# Patient Record
Sex: FEMALE | Race: WHITE | Employment: OTHER | URBAN - METROPOLITAN AREA
[De-identification: names, ages, dates, MRNs, and addresses within clinical notes are randomized per-mention and may not be internally consistent; named-entity substitution may affect disease eponyms.]

---

## 2017-10-25 ENCOUNTER — HOSPITAL ENCOUNTER (OUTPATIENT)
Dept: LAB | Age: 72
Discharge: HOME | End: 2017-10-25
Attending: SPECIALIST | Admitting: SPECIALIST

## 2017-10-28 ENCOUNTER — HOSPITAL ENCOUNTER (OUTPATIENT)
Dept: OTHER | Age: 72
Discharge: HOME | End: 2017-10-28
Attending: INTERNAL MEDICINE | Admitting: INTERNAL MEDICINE

## 2017-10-29 LAB — CLOSTRIDIUM DIFFICILE, PCR: NORMAL

## 2017-10-30 ENCOUNTER — PAT TELEPHONE (OUTPATIENT)
Dept: SURGERY | Age: 72
End: 2017-10-30

## 2017-10-30 VITALS — HEIGHT: 65 IN | BODY MASS INDEX: 33.99 KG/M2 | WEIGHT: 204 LBS

## 2017-10-30 NOTE — PROGRESS NOTES
1. Hx of anesthesia complications? -- no  2. Hx of difficult airway/intubation? -- no  3. Hx of changed chest pain/CHF exacerbation in last 6 mo. ? -- no  4. Home  O2 dependent? -- no  5. Able to climb one flight of stairs w/o SOB? -- yes  6. Recent COPD exacerbation or pneumonia/bronchitis that has been treated in last      month? -- no       1. Do not eat or drink anything after 12 midnight (or____hours) unless instructed by your doctor prior to surgery. This includes no water, chewing gum or mints. 2. Follow your directions as prescribed by the doctor for your procedure and medications. 3.Check with your Doctor regarding stopping Plavix, Coumadin, Lovenox,Effient,Pradaxa,Xarelto, Fragmin or other blood thinners and follow their instructions. 4. Do not smoke, and do not drink any alcoholic beverages 24 hours prior to surgery. This includes NA Beer. 5. You may brush your teeth and gargle the morning of surgery. DO NOT SWALLOW WATER   6. You MUST make arrangements for a responsible adult to take you home after your surgery and be able to check on you every couple hours for the day. You will not be allowed to leave alone or drive yourself home. It is strongly suggested someone stay with you the first 24 hrs. Your surgery will be cancelled if you do not have a ride home. 7. Please wear simple, loose fitting clothing to the hospital.  Skeet Lute not bring valuables (money, credit cards, checkbooks, etc.) Do not wear any makeup (including no eye makeup) or nail polish on your fingers or toes. 8. DO NOT wear any jewelry or piercings on day of surgery. All body piercing jewelry must be removed. 9. If you have dentures, they will be removed before going to the OR; we will provide you a container. If you wear contact lenses or glasses, they will be removed; please bring a case for them.              10. If you  have a Living Will and Durable Power of  for Healthcare, please bring in a

## 2017-10-31 NOTE — ANESTHESIA PRE-OP
Department of Anesthesiology  Preprocedure Note       Name:  Mily Pete   Age:  74 y.o.  :  1945                                          MRN:  [id number]         Date:  10/31/2017      Surgeon:    Procedure: colonoscopy    Medications prior to admission:   Prior to Admission medications    Medication Sig Start Date End Date Taking? Authorizing Provider   oxyCODONE-acetaminophen (PERCOCET) 5-325 MG per tablet  4/15/16   Historical Provider, MD   ibuprofen (ADVIL;MOTRIN) 600 MG tablet Take 1 tablet by mouth every 6 hours as needed for Pain 8/22/15   Akash Rosas MD   clopidogrel (PLAVIX) 75 MG tablet TAKE 1 TABLET DAILY 14   Kristin Teixeira MD   carvedilol (COREG) 25 MG tablet Take 1 tablet by mouth daily. 14   Historical Provider, MD   losartan-hydrochlorothiazide (HYZAAR) 100-25 MG per tablet Take 1 tablet by mouth daily. Historical Provider, MD   fluticasone-salmeterol (ADVAIR) 250-50 MCG/DOSE AEPB Inhale 1 puff into the lungs every 12 hours. Historical Provider, MD   albuterol (PROVENTIL HFA;VENTOLIN HFA) 108 (90 BASE) MCG/ACT inhaler Inhale 2 puffs into the lungs every 6 hours as needed for Wheezing. 5/15/13 9/2/14  Kristin Teixeira MD   pantoprazole (PROTONIX) 40 MG tablet Take 40 mg by mouth daily  13   Historical Provider, MD   metformin (GLUCOPHAGE) 500 MG tablet TAKE 1 TABLET BY MOUTH TWICE A DAY WITH MEALS 13   Kristin Teixeira MD   insulin glargine (LANTUS SOLOSTAR) 100 UNIT/ML injection INJECT 48 UNITS UNDER THE SKIN NIGHTLY 5/15/13   Kristin Teixeira MD   Insulin Pen Needle (UNIFINE PENTIPS) 31G X 8 MM MISC Injects once daily 5/15/13   Kristin Teixeira MD   torsemide (DEMADEX) 5 MG tablet Take 1 tablet by mouth daily. For swelling. 3/21/13   Homero Jara MD   atorvastatin (LIPITOR) 20 MG tablet Take 1 tablet by mouth daily. 3/21/13   Homero Jara MD   cloNIDine (CATAPRES) 0.1 MG tablet Take 1 tablet by mouth 2 times daily.  3/21/13   [place] Bria Kelly MD   amLODIPine (NORVASC) 5 MG tablet Take 1 tab at bedtime for blood pressure 3/21/13   Sadia Gilliland MD   sertraline (ZOLOFT) 50 MG tablet Take 1 tablet by mouth daily. 3/21/13   Sadia Gilliland MD   albuterol (PROVENTIL HFA) 108 (90 BASE) MCG/ACT inhaler Inhale 2 puffs into the lungs every 6 hours as needed for Wheezing. 5/14/12 5/14/13  Jake Jacobson MD       Current medications:    Current Outpatient Prescriptions   Medication Sig Dispense Refill    oxyCODONE-acetaminophen (PERCOCET) 5-325 MG per tablet       ibuprofen (ADVIL;MOTRIN) 600 MG tablet Take 1 tablet by mouth every 6 hours as needed for Pain 30 tablet 0    clopidogrel (PLAVIX) 75 MG tablet TAKE 1 TABLET DAILY 90 tablet 3    carvedilol (COREG) 25 MG tablet Take 1 tablet by mouth daily.  losartan-hydrochlorothiazide (HYZAAR) 100-25 MG per tablet Take 1 tablet by mouth daily.  fluticasone-salmeterol (ADVAIR) 250-50 MCG/DOSE AEPB Inhale 1 puff into the lungs every 12 hours.  albuterol (PROVENTIL HFA;VENTOLIN HFA) 108 (90 BASE) MCG/ACT inhaler Inhale 2 puffs into the lungs every 6 hours as needed for Wheezing.  pantoprazole (PROTONIX) 40 MG tablet Take 40 mg by mouth daily       metformin (GLUCOPHAGE) 500 MG tablet TAKE 1 TABLET BY MOUTH TWICE A DAY WITH MEALS 60 tablet 5    insulin glargine (LANTUS SOLOSTAR) 100 UNIT/ML injection INJECT 48 UNITS UNDER THE SKIN NIGHTLY 15 Pen 3    Insulin Pen Needle (UNIFINE PENTIPS) 31G X 8 MM MISC Injects once daily 100 each 3    torsemide (DEMADEX) 5 MG tablet Take 1 tablet by mouth daily. For swelling. 90 tablet 3    atorvastatin (LIPITOR) 20 MG tablet Take 1 tablet by mouth daily. 90 tablet 3    cloNIDine (CATAPRES) 0.1 MG tablet Take 1 tablet by mouth 2 times daily. 180 tablet 3    amLODIPine (NORVASC) 5 MG tablet Take 1 tab at bedtime for blood pressure 90 tablet 3    sertraline (ZOLOFT) 50 MG tablet Take 1 tablet by mouth daily.  30 tablet 1    albuterol 1 Cans of beer per week      Comment: rarely                                Counseling given: Not Answered      Vital Signs (Current): There were no vitals filed for this visit. BP Readings from Last 3 Encounters:   06/01/16 142/80   04/18/16 130/60   04/06/16 124/72       NPO Status:                                                                                 BMI:   Wt Readings from Last 3 Encounters:   10/30/17 204 lb (92.5 kg)   09/15/16 215 lb (97.5 kg)   09/07/16 215 lb (97.5 kg)     There is no height or weight on file to calculate BMI. Anesthesia Evaluation    Airway:         Dental:          Pulmonary:   (+) sleep apnea: on CPAP,                             Cardiovascular:    (+) hypertension:, hyperlipidemia         Beta Blocker:  Not on Beta Blocker         Neuro/Psych:   (+) CVA:,             GI/Hepatic/Renal:   (+) hiatal hernia, GERD:, bowel prep          Endo/Other:    (+) Type II DM, using insulin,                  Abdominal:           Vascular:                                      Anesthesia Plan      MAC     ASA 3       Induction: intravenous. Pre Anesthesia Assessment complete. Chart reviewed on 10/31/2017. This is a chart review only.       Bryant Link DO   10/31/2017

## 2017-11-01 LAB
CULTURE: NORMAL
OVA AND PARASITE WET MOUNT: NEGATIVE
REPORT STATUS: NORMAL
REQUEST PROBLEM: NORMAL
SPECIMEN: NORMAL
TRICHROME SMEAR, STOOL O&P: NEGATIVE

## 2017-11-02 ENCOUNTER — HOSPITAL ENCOUNTER (OUTPATIENT)
Dept: SURGERY | Age: 72
Discharge: HOME | End: 2017-11-02
Attending: SPECIALIST | Admitting: SPECIALIST

## 2017-11-02 VITALS
OXYGEN SATURATION: 97 % | BODY MASS INDEX: 34.32 KG/M2 | SYSTOLIC BLOOD PRESSURE: 158 MMHG | HEART RATE: 56 BPM | TEMPERATURE: 97.7 F | HEIGHT: 65 IN | DIASTOLIC BLOOD PRESSURE: 64 MMHG | WEIGHT: 206 LBS | RESPIRATION RATE: 16 BRPM

## 2017-11-02 LAB — GLUCOSE BLD-MCNC: 111 MG/DL (ref 70–99)

## 2017-11-02 RX ORDER — SODIUM CHLORIDE, SODIUM LACTATE, POTASSIUM CHLORIDE, CALCIUM CHLORIDE 600; 310; 30; 20 MG/100ML; MG/100ML; MG/100ML; MG/100ML
INJECTION, SOLUTION INTRAVENOUS CONTINUOUS
Status: DISCONTINUED | OUTPATIENT
Start: 2017-11-02 | End: 2017-11-03 | Stop reason: HOSPADM

## 2017-11-02 RX ADMIN — SODIUM CHLORIDE, SODIUM LACTATE, POTASSIUM CHLORIDE, CALCIUM CHLORIDE: 600; 310; 30; 20 INJECTION, SOLUTION INTRAVENOUS at 10:29

## 2017-11-02 ASSESSMENT — PAIN SCALES - GENERAL
PAINLEVEL_OUTOF10: 0
PAINLEVEL_OUTOF10: 0

## 2017-11-02 ASSESSMENT — PAIN - FUNCTIONAL ASSESSMENT: PAIN_FUNCTIONAL_ASSESSMENT: 0-10

## 2017-11-02 NOTE — PROGRESS NOTES
1222 denies pain or nausea. Head of bed up. Dr Anna Juarez provided update at bedside, call light in reach  122 coffee and melecio crackers provided  [phone] denies needs  [phone] discharge instructions reviewed.  Verbalized understanding  [phone] ambulated to bathroom  [phone] discharged to car

## 2017-11-02 NOTE — ANESTHESIA PRE-OP
Department of Anesthesiology  Preprocedure Note       Name:  Trever Quinteros   Age:  74 y.o.  :  1945                                          MRN:  [id number]         Date:  2017      Surgeon:    Procedure: colonoscopy    Medications prior to admission:   Prior to Admission medications    Medication Sig Start Date End Date Taking? Authorizing Provider   oxyCODONE-acetaminophen (PERCOCET) 5-325 MG per tablet  4/15/16   Historical Provider, MD   ibuprofen (ADVIL;MOTRIN) 600 MG tablet Take 1 tablet by mouth every 6 hours as needed for Pain 8/22/15   Niles Fuller MD   clopidogrel (PLAVIX) 75 MG tablet TAKE 1 TABLET DAILY 14   Di Meadows MD   carvedilol (COREG) 25 MG tablet Take 1 tablet by mouth daily. 14   Historical Provider, MD   losartan-hydrochlorothiazide (HYZAAR) 100-25 MG per tablet Take 1 tablet by mouth daily. Historical Provider, MD   fluticasone-salmeterol (ADVAIR) 250-50 MCG/DOSE AEPB Inhale 1 puff into the lungs every 12 hours. Historical Provider, MD   albuterol (PROVENTIL HFA;VENTOLIN HFA) 108 (90 BASE) MCG/ACT inhaler Inhale 2 puffs into the lungs every 6 hours as needed for Wheezing. 5/15/13 9/2/14  Di Meadows MD   pantoprazole (PROTONIX) 40 MG tablet Take 40 mg by mouth daily  13   Historical Provider, MD   metformin (GLUCOPHAGE) 500 MG tablet TAKE 1 TABLET BY MOUTH TWICE A DAY WITH MEALS 13   Di Meadows MD   insulin glargine (LANTUS SOLOSTAR) 100 UNIT/ML injection INJECT 48 UNITS UNDER THE SKIN NIGHTLY 5/15/13   Di Meadows MD   Insulin Pen Needle (UNIFINE PENTIPS) 31G X 8 MM MISC Injects once daily 5/15/13   Di Meadows MD   torsemide (DEMADEX) 5 MG tablet Take 1 tablet by mouth daily. For swelling. 3/21/13   Flash Carvajal MD   atorvastatin (LIPITOR) 20 MG tablet Take 1 tablet by mouth daily. 3/21/13   Flash Carvajal MD   cloNIDine (CATAPRES) 0.1 MG tablet Take 1 tablet by mouth 2 times daily.  3/21/13   Christiano Issa Dallin Mchugh MD   amLODIPine (NORVASC) 5 MG tablet Take 1 tab at bedtime for blood pressure 3/21/13   Hans Escobar MD   sertraline (ZOLOFT) 50 MG tablet Take 1 tablet by mouth daily. 3/21/13   Hans Escobar MD   albuterol (PROVENTIL HFA) 108 (90 BASE) MCG/ACT inhaler Inhale 2 puffs into the lungs every 6 hours as needed for Wheezing. 5/14/12 5/14/13  Lidia Rodriguez MD       Current medications:    Current Outpatient Prescriptions   Medication Sig Dispense Refill    oxyCODONE-acetaminophen (PERCOCET) 5-325 MG per tablet       ibuprofen (ADVIL;MOTRIN) 600 MG tablet Take 1 tablet by mouth every 6 hours as needed for Pain 30 tablet 0    clopidogrel (PLAVIX) 75 MG tablet TAKE 1 TABLET DAILY 90 tablet 3    carvedilol (COREG) 25 MG tablet Take 1 tablet by mouth daily.  losartan-hydrochlorothiazide (HYZAAR) 100-25 MG per tablet Take 1 tablet by mouth daily.  fluticasone-salmeterol (ADVAIR) 250-50 MCG/DOSE AEPB Inhale 1 puff into the lungs every 12 hours.  albuterol (PROVENTIL HFA;VENTOLIN HFA) 108 (90 BASE) MCG/ACT inhaler Inhale 2 puffs into the lungs every 6 hours as needed for Wheezing.  pantoprazole (PROTONIX) 40 MG tablet Take 40 mg by mouth daily       metformin (GLUCOPHAGE) 500 MG tablet TAKE 1 TABLET BY MOUTH TWICE A DAY WITH MEALS 60 tablet 5    insulin glargine (LANTUS SOLOSTAR) 100 UNIT/ML injection INJECT 48 UNITS UNDER THE SKIN NIGHTLY 15 Pen 3    Insulin Pen Needle (UNIFINE PENTIPS) 31G X 8 MM MISC Injects once daily 100 each 3    torsemide (DEMADEX) 5 MG tablet Take 1 tablet by mouth daily. For swelling. 90 tablet 3    atorvastatin (LIPITOR) 20 MG tablet Take 1 tablet by mouth daily. 90 tablet 3    cloNIDine (CATAPRES) 0.1 MG tablet Take 1 tablet by mouth 2 times daily. 180 tablet 3    amLODIPine (NORVASC) 5 MG tablet Take 1 tab at bedtime for blood pressure 90 tablet 3    sertraline (ZOLOFT) 50 MG tablet Take 1 tablet by mouth daily.  30 tablet 1    albuterol 1 Cans of beer per week      Comment: rarely                                Counseling given: Not Answered      Vital Signs (Current): There were no vitals filed for this visit. BP Readings from Last 3 Encounters:   06/01/16 142/80   04/18/16 130/60   04/06/16 124/72       NPO Status:                                                                                 BMI:   Wt Readings from Last 3 Encounters:   10/30/17 204 lb (92.5 kg)   09/15/16 215 lb (97.5 kg)   09/07/16 215 lb (97.5 kg)     There is no height or weight on file to calculate BMI. Anesthesia Evaluation   no history of anesthetic complications:   Airway: Mallampati: II  TM distance: >3 FB   Neck ROM: full  Mouth opening: > = 3 FB Dental:    (+) lower dentures and upper dentures      Pulmonary:   (+) sleep apnea: on CPAP,                             Cardiovascular:  Exercise tolerance: poor (<4 METS),   (+) hypertension:, hyperlipidemia         Beta Blocker:  Not on Beta Blocker         Neuro/Psych:   (+) CVA:,             GI/Hepatic/Renal:   (+) hiatal hernia, GERD:, bowel prep          Endo/Other:    (+) Type II DM, using insulin,                  Abdominal:           Vascular:                                    Anesthesia Plan      MAC     ASA 3       Induction: intravenous. Anesthetic plan and risks discussed with patient.                 Dayna Elaine DO   11/2/2017

## 2017-12-13 ENCOUNTER — HOSPITAL ENCOUNTER (OUTPATIENT)
Dept: WOMENS IMAGING | Age: 72
Discharge: HOME | End: 2017-12-13
Attending: INTERNAL MEDICINE | Admitting: INTERNAL MEDICINE

## 2017-12-13 DIAGNOSIS — Z12.31 SCREENING MAMMOGRAM, ENCOUNTER FOR: ICD-10-CM

## 2018-07-25 ENCOUNTER — HOSPITAL ENCOUNTER (OUTPATIENT)
Dept: PHYSICAL THERAPY | Age: 73
Discharge: HOME | End: 2018-07-31
Attending: ORTHOPAEDIC SURGERY | Admitting: ORTHOPAEDIC SURGERY

## 2018-07-25 ASSESSMENT — PAIN DESCRIPTION - DESCRIPTORS: DESCRIPTORS: ACHING

## 2018-07-25 ASSESSMENT — PAIN DESCRIPTION - ORIENTATION: ORIENTATION: LEFT;INNER

## 2018-07-25 ASSESSMENT — PAIN SCALES - GENERAL: PAINLEVEL_OUTOF10: 4

## 2018-07-25 ASSESSMENT — PAIN DESCRIPTION - LOCATION: LOCATION: KNEE

## 2018-07-25 ASSESSMENT — PAIN DESCRIPTION - FREQUENCY: FREQUENCY: CONTINUOUS

## 2018-07-25 ASSESSMENT — PAIN DESCRIPTION - PAIN TYPE: TYPE: ACUTE PAIN

## 2018-07-25 NOTE — PROGRESS NOTES
Physical Therapy  Initial Assessment  Date: 2018  Patient Name: Al Santoyo  MRN: [id number]  : 1945     Treatment Diagnosis: L knee medial meniscus tear    Restrictions  Position Activity Restriction  Other position/activity restrictions: No squats, no lunges or open chain exercises    Subjective   General  Chart Reviewed: Yes  Patient assessed for rehabilitation services?: Yes  Referring Practitioner: Dr Dory Canseco  Diagnosis: torn meniscus L knee  Follows Commands: Within Functional Limits  PT Visit Information  PT Insurance Information: Medicare  G-Codes at visit #10  Subjective  Subjective: Pt reports fall in her shower  in . Hit L knee into the door frame with pain and swelling. Was seen for injection, and she was feeling better, was walking up her cellar steps when she felt the L knee pop. MRI showed meniscus tear. Knee pain and swelling have been constant since that time and limiting to standing, walking, community activities and sleep. To return to surgeon 18 to schedule surgical repair. Sig med hx for torn R knee meniscus 5-6 yrs ago and s/p ORIF R ankle 2015. Pain Screening  Patient Currently in Pain: Yes  Pain Assessment  Pain Assessment: 0-10  Pain Level: 4 (at worst 10/10)  Pain Type: Acute pain  Pain Location: Knee  Pain Orientation: Left; Inner  Pain Radiating Towards: mid medial L lower leg  Pain Descriptors: Aching (\"it just hurts\")  Pain Frequency: Continuous  Vital Signs  Patient Currently in Pain: Yes    Vision/Hearing  Vision  Vision: Impaired (wears glasses)  Hearing  Hearing: Within functional limits    Orientation  Orientation  Overall Orientation Status: Within Normal Limits    Social/Functional History  Social/Functional History  Lives With: Spouse  Type of Home: House  Home Layout: Multi-level ((+) 1 step into the living room)  Home Access: Stairs to enter without rails (handle for the 2 steps into the kitchen)  Entrance Stairs - Number of
elevated; good skin appearance post Rx                                 HEP: Patient instructed and issued in HEP. Any questions answered and clarified, patient demonstrated understanding. Supervision/Cues:  Pt received education on their current pathology and how their condition affects functional activities. Pt understood discussion and questions were answered. Pt understands their activity limitations and understands rational for treatment progression. Objectives:       Response to intervention: L knee pain aggravated by isometrics; decreased to 3/10 post Vaso      Overall change since Evaluation:       Prognosis: fair      Plan for Next Session: Progress to pt's nnamdi as outlined above        Intervention used today:  [x] Therapeutic Exercise    [] Therapeutic Activity     [] Ultrasound  [] Elec  Stim  [x] Gait Training      [] Cervical Traction [] Lumbar Traction  [] Neuromuscular Re-education    [] Cold/hotpack [] Iontophoresis   [x] Instruction in HEP      [x] Vasopneumatic     [] Manual Therapy               [] Self care home management                    (    ) Dry needling    Post Tx Pain Rating:  3/10     Plan:  (Fequency/duration/# of visits)  1x/week x 2-4 weeks  [] Continue per plan of care [] Alter current plan   [x] Plan of care initiated [] Hold pending MD visit [] Discharge    Time In: 11:10  Time Out: 12:15  Timed Code Treatment Minutes:  25'  Total Treatment Minutes: 72'     Electronically signed by:  Ridge Pollard PT   7/25/2018, 12:56 PM

## 2018-08-01 ENCOUNTER — HOSPITAL ENCOUNTER (OUTPATIENT)
Dept: OTHER | Age: 73
Discharge: HOME | End: 2018-08-31
Attending: ORTHOPAEDIC SURGERY | Admitting: ORTHOPAEDIC SURGERY

## 2018-08-06 ENCOUNTER — HOSPITAL ENCOUNTER (OUTPATIENT)
Dept: GENERAL RADIOLOGY | Age: 73
Discharge: HOME | End: 2018-08-06
Attending: ANESTHESIOLOGY | Admitting: ANESTHESIOLOGY

## 2018-08-06 LAB
ANION GAP SERPL CALCULATED.3IONS-SCNC: 15 MMOL/L (ref 4–16)
BUN BLDV-MCNC: 19 MG/DL (ref 6–23)
CALCIUM SERPL-MCNC: 9 MG/DL (ref 8.3–10.6)
CHLORIDE BLD-SCNC: 97 MMOL/L (ref 99–110)
CO2: 29 MMOL/L (ref 21–32)
CREAT SERPL-MCNC: 1 MG/DL (ref 0.6–1.1)
GFR AFRICAN AMERICAN: >60 ML/MIN/1.73M2
GFR NON-AFRICAN AMERICAN: 54 ML/MIN/1.73M2
GLUCOSE BLD-MCNC: 109 MG/DL (ref 70–99)
POTASSIUM SERPL-SCNC: 3.6 MMOL/L (ref 3.5–5.1)
SODIUM BLD-SCNC: 141 MMOL/L (ref 135–145)

## 2018-08-07 LAB
EKG ATRIAL RATE: 60 BPM
EKG DIAGNOSIS: NORMAL
EKG P AXIS: 69 DEGREES
EKG P-R INTERVAL: 160 MS
EKG Q-T INTERVAL: 464 MS
EKG QRS DURATION: 94 MS
EKG QTC CALCULATION (BAZETT): 464 MS
EKG R AXIS: -14 DEGREES
EKG T AXIS: 73 DEGREES
EKG VENTRICULAR RATE: 60 BPM

## 2018-08-09 ENCOUNTER — INITIAL CONSULT (OUTPATIENT)
Dept: CARDIOLOGY CLINIC | Age: 73
End: 2018-08-09

## 2018-08-09 ENCOUNTER — TELEPHONE (OUTPATIENT)
Dept: CARDIOLOGY CLINIC | Age: 73
End: 2018-08-09

## 2018-08-09 VITALS
SYSTOLIC BLOOD PRESSURE: 172 MMHG | WEIGHT: 221 LBS | BODY MASS INDEX: 36.82 KG/M2 | DIASTOLIC BLOOD PRESSURE: 80 MMHG | HEART RATE: 64 BPM | HEIGHT: 65 IN | RESPIRATION RATE: 16 BRPM

## 2018-08-09 DIAGNOSIS — Z01.818 PRE-OPERATIVE CLEARANCE: ICD-10-CM

## 2018-08-09 DIAGNOSIS — I10 ESSENTIAL HYPERTENSION: ICD-10-CM

## 2018-08-09 DIAGNOSIS — E78.2 MIXED HYPERLIPIDEMIA: Primary | ICD-10-CM

## 2018-08-09 DIAGNOSIS — E11.9 TYPE 2 DIABETES MELLITUS WITHOUT COMPLICATION, WITH LONG-TERM CURRENT USE OF INSULIN (HCC): ICD-10-CM

## 2018-08-09 DIAGNOSIS — Z86.73 H/O: CVA (CEREBROVASCULAR ACCIDENT): ICD-10-CM

## 2018-08-09 DIAGNOSIS — Z79.4 TYPE 2 DIABETES MELLITUS WITHOUT COMPLICATION, WITH LONG-TERM CURRENT USE OF INSULIN (HCC): ICD-10-CM

## 2018-08-09 PROCEDURE — 99204 OFFICE O/P NEW MOD 45 MIN: CPT | Performed by: INTERNAL MEDICINE

## 2018-08-09 PROCEDURE — G8417 CALC BMI ABV UP PARAM F/U: HCPCS | Performed by: INTERNAL MEDICINE

## 2018-08-09 PROCEDURE — 1101F PT FALLS ASSESS-DOCD LE1/YR: CPT | Performed by: INTERNAL MEDICINE

## 2018-08-09 PROCEDURE — 1090F PRES/ABSN URINE INCON ASSESS: CPT | Performed by: INTERNAL MEDICINE

## 2018-08-09 PROCEDURE — 3017F COLORECTAL CA SCREEN DOC REV: CPT | Performed by: INTERNAL MEDICINE

## 2018-08-09 PROCEDURE — 2022F DILAT RTA XM EVC RTNOPTHY: CPT | Performed by: INTERNAL MEDICINE

## 2018-08-09 PROCEDURE — G8427 DOCREV CUR MEDS BY ELIG CLIN: HCPCS | Performed by: INTERNAL MEDICINE

## 2018-08-09 RX ORDER — CITALOPRAM 20 MG/1
TABLET ORAL
Refills: 1 | COMMUNITY
Start: 2018-07-03

## 2018-08-09 RX ORDER — HYDROXYZINE HYDROCHLORIDE 10 MG/1
TABLET, FILM COATED ORAL
Refills: 3 | COMMUNITY
Start: 2018-08-06 | End: 2020-04-27

## 2018-08-09 RX ORDER — AMLODIPINE BESYLATE 5 MG/1
5 TABLET ORAL DAILY
Qty: 30 TABLET | Refills: 3 | Status: SHIPPED | OUTPATIENT
Start: 2018-08-09 | End: 2020-04-27

## 2018-08-09 RX ORDER — BETAMETHASONE DIPROPIONATE 0.5 MG/G
CREAM TOPICAL
Refills: 3 | COMMUNITY
Start: 2018-08-06

## 2018-08-09 RX ORDER — LEVOFLOXACIN 500 MG/1
TABLET, FILM COATED ORAL
Refills: 0 | COMMUNITY
Start: 2018-05-22 | End: 2018-08-09

## 2018-08-09 RX ORDER — GLIMEPIRIDE 2 MG/1
TABLET ORAL
Refills: 1 | COMMUNITY
Start: 2018-06-02

## 2018-08-09 NOTE — TELEPHONE ENCOUNTER
Cardiac risk assessment letter (may be seen under the \"Letters\" tab in [facility]) and today's consult note faxed to Dr. Lavonne Mcclelland/Dr. Duane Oddi at [facility], fax # [phone], Att:  Marielena Johnson.

## 2018-09-06 ENCOUNTER — PROCEDURE VISIT (OUTPATIENT)
Dept: CARDIOLOGY CLINIC | Age: 73
End: 2018-09-06

## 2018-09-06 DIAGNOSIS — Z01.818 PRE-OPERATIVE CLEARANCE: ICD-10-CM

## 2018-09-06 DIAGNOSIS — Z86.73 H/O: CVA (CEREBROVASCULAR ACCIDENT): ICD-10-CM

## 2018-09-06 DIAGNOSIS — E11.9 TYPE 2 DIABETES MELLITUS WITHOUT COMPLICATION, WITH LONG-TERM CURRENT USE OF INSULIN (HCC): ICD-10-CM

## 2018-09-06 DIAGNOSIS — Z79.4 TYPE 2 DIABETES MELLITUS WITHOUT COMPLICATION, WITH LONG-TERM CURRENT USE OF INSULIN (HCC): ICD-10-CM

## 2018-09-06 DIAGNOSIS — E78.2 MIXED HYPERLIPIDEMIA: ICD-10-CM

## 2018-09-06 DIAGNOSIS — I10 ESSENTIAL HYPERTENSION: ICD-10-CM

## 2018-09-06 LAB
LV EF: 63 %
LVEF MODALITY: NORMAL

## 2018-09-06 PROCEDURE — 78452 HT MUSCLE IMAGE SPECT MULT: CPT | Performed by: INTERNAL MEDICINE

## 2018-09-06 PROCEDURE — 93018 CV STRESS TEST I&R ONLY: CPT | Performed by: INTERNAL MEDICINE

## 2018-09-06 PROCEDURE — 93016 CV STRESS TEST SUPVJ ONLY: CPT | Performed by: INTERNAL MEDICINE

## 2018-09-06 PROCEDURE — A9500 TC99M SESTAMIBI: HCPCS | Performed by: INTERNAL MEDICINE

## 2018-09-06 PROCEDURE — 93017 CV STRESS TEST TRACING ONLY: CPT | Performed by: INTERNAL MEDICINE

## 2018-09-08 PROBLEM — Z01.818 PRE-OPERATIVE CLEARANCE: Status: RESOLVED | Noted: 2018-08-09 | Resolved: 2018-09-08

## 2018-09-10 ENCOUNTER — TELEPHONE (OUTPATIENT)
Dept: CARDIOLOGY CLINIC | Age: 73
End: 2018-09-10

## 2019-03-27 ENCOUNTER — APPOINTMENT (OUTPATIENT)
Dept: CT IMAGING | Age: 74
End: 2019-03-27
Payer: MEDICARE

## 2019-03-27 ENCOUNTER — APPOINTMENT (OUTPATIENT)
Dept: GENERAL RADIOLOGY | Age: 74
End: 2019-03-27
Payer: MEDICARE

## 2019-03-27 ENCOUNTER — HOSPITAL ENCOUNTER (EMERGENCY)
Age: 74
Discharge: ANOTHER HOSPITAL | End: 2019-03-27
Attending: EMERGENCY MEDICINE
Payer: MEDICARE

## 2019-03-27 VITALS
TEMPERATURE: 97.7 F | BODY MASS INDEX: 35.49 KG/M2 | HEIGHT: 65 IN | RESPIRATION RATE: 15 BRPM | OXYGEN SATURATION: 99 % | HEART RATE: 63 BPM | SYSTOLIC BLOOD PRESSURE: 182 MMHG | DIASTOLIC BLOOD PRESSURE: 65 MMHG | WEIGHT: 213 LBS

## 2019-03-27 DIAGNOSIS — I63.9 CEREBROVASCULAR ACCIDENT (CVA), UNSPECIFIED MECHANISM (HCC): Primary | ICD-10-CM

## 2019-03-27 LAB
ALBUMIN SERPL-MCNC: 3.9 GM/DL (ref 3.4–5)
ALP BLD-CCNC: 87 IU/L (ref 40–129)
ALT SERPL-CCNC: 30 U/L (ref 10–40)
ANION GAP SERPL CALCULATED.3IONS-SCNC: 14 MMOL/L (ref 4–16)
AST SERPL-CCNC: 25 IU/L (ref 15–37)
BASOPHILS ABSOLUTE: 0 K/CU MM
BASOPHILS RELATIVE PERCENT: 0.3 % (ref 0–1)
BILIRUB SERPL-MCNC: 0.8 MG/DL (ref 0–1)
BUN BLDV-MCNC: 23 MG/DL (ref 6–23)
CALCIUM SERPL-MCNC: 8.4 MG/DL (ref 8.3–10.6)
CHLORIDE BLD-SCNC: 98 MMOL/L (ref 99–110)
CHP ED QC CHECK: NORMAL
CO2: 22 MMOL/L (ref 21–32)
CREAT SERPL-MCNC: 1.1 MG/DL (ref 0.6–1.1)
DIFFERENTIAL TYPE: ABNORMAL
EOSINOPHILS ABSOLUTE: 0.2 K/CU MM
EOSINOPHILS RELATIVE PERCENT: 1.2 % (ref 0–3)
GFR AFRICAN AMERICAN: 59 ML/MIN/1.73M2
GFR NON-AFRICAN AMERICAN: 48 ML/MIN/1.73M2
GLUCOSE BLD-MCNC: 185 MG/DL (ref 70–99)
GLUCOSE BLD-MCNC: 218 MG/DL
GLUCOSE BLD-MCNC: 218 MG/DL (ref 70–99)
HCT VFR BLD CALC: 40 % (ref 37–47)
HEMOGLOBIN: 12.9 GM/DL (ref 12.5–16)
IMMATURE NEUTROPHIL %: 0.5 % (ref 0–0.43)
INR BLD: 1.01 INDEX
LYMPHOCYTES ABSOLUTE: 2.8 K/CU MM
LYMPHOCYTES RELATIVE PERCENT: 20.4 % (ref 24–44)
MAGNESIUM: 1.5 MG/DL (ref 1.8–2.4)
MCH RBC QN AUTO: 29.4 PG (ref 27–31)
MCHC RBC AUTO-ENTMCNC: 32.3 % (ref 32–36)
MCV RBC AUTO: 91.1 FL (ref 78–100)
MONOCYTES ABSOLUTE: 0.9 K/CU MM
MONOCYTES RELATIVE PERCENT: 6.3 % (ref 0–4)
NUCLEATED RBC %: 0 %
PDW BLD-RTO: 14.6 % (ref 11.7–14.9)
PLATELET # BLD: 275 K/CU MM (ref 140–440)
PMV BLD AUTO: 11.5 FL (ref 7.5–11.1)
POTASSIUM SERPL-SCNC: 3.5 MMOL/L (ref 3.5–5.1)
PROTHROMBIN TIME: 11.5 SECONDS (ref 9.12–12.5)
RBC # BLD: 4.39 M/CU MM (ref 4.2–5.4)
SEGMENTED NEUTROPHILS ABSOLUTE COUNT: 9.8 K/CU MM
SEGMENTED NEUTROPHILS RELATIVE PERCENT: 71.3 % (ref 36–66)
SODIUM BLD-SCNC: 134 MMOL/L (ref 135–145)
TOTAL IMMATURE NEUTOROPHIL: 0.07 K/CU MM
TOTAL NUCLEATED RBC: 0 K/CU MM
TOTAL PROTEIN: 7 GM/DL (ref 6.4–8.2)
TROPONIN T: <0.01 NG/ML
WBC # BLD: 13.8 K/CU MM (ref 4–10.5)

## 2019-03-27 PROCEDURE — 80053 COMPREHEN METABOLIC PANEL: CPT

## 2019-03-27 PROCEDURE — 96374 THER/PROPH/DIAG INJ IV PUSH: CPT

## 2019-03-27 PROCEDURE — 93005 ELECTROCARDIOGRAM TRACING: CPT | Performed by: EMERGENCY MEDICINE

## 2019-03-27 PROCEDURE — 96375 TX/PRO/DX INJ NEW DRUG ADDON: CPT

## 2019-03-27 PROCEDURE — 83735 ASSAY OF MAGNESIUM: CPT

## 2019-03-27 PROCEDURE — 85025 COMPLETE CBC W/AUTO DIFF WBC: CPT

## 2019-03-27 PROCEDURE — 70450 CT HEAD/BRAIN W/O DYE: CPT

## 2019-03-27 PROCEDURE — 85610 PROTHROMBIN TIME: CPT

## 2019-03-27 PROCEDURE — 36415 COLL VENOUS BLD VENIPUNCTURE: CPT

## 2019-03-27 PROCEDURE — 6360000002 HC RX W HCPCS: Performed by: EMERGENCY MEDICINE

## 2019-03-27 PROCEDURE — 99291 CRITICAL CARE FIRST HOUR: CPT

## 2019-03-27 PROCEDURE — 84484 ASSAY OF TROPONIN QUANT: CPT

## 2019-03-27 PROCEDURE — 82962 GLUCOSE BLOOD TEST: CPT

## 2019-03-27 PROCEDURE — 2500000003 HC RX 250 WO HCPCS: Performed by: EMERGENCY MEDICINE

## 2019-03-27 RX ORDER — LABETALOL HYDROCHLORIDE 5 MG/ML
10 INJECTION, SOLUTION INTRAVENOUS ONCE
Status: COMPLETED | OUTPATIENT
Start: 2019-03-27 | End: 2019-03-27

## 2019-03-27 RX ORDER — DEXTROSE MONOHYDRATE 25 G/50ML
12.5 INJECTION, SOLUTION INTRAVENOUS
Status: DISCONTINUED | OUTPATIENT
Start: 2019-03-27 | End: 2019-03-27 | Stop reason: HOSPADM

## 2019-03-27 RX ORDER — HYDRALAZINE HYDROCHLORIDE 20 MG/ML
20 INJECTION INTRAMUSCULAR; INTRAVENOUS ONCE
Status: COMPLETED | OUTPATIENT
Start: 2019-03-27 | End: 2019-03-27

## 2019-03-27 RX ORDER — SODIUM CHLORIDE 0.9 % (FLUSH) 0.9 %
10 SYRINGE (ML) INJECTION EVERY 12 HOURS SCHEDULED
Status: DISCONTINUED | OUTPATIENT
Start: 2019-03-27 | End: 2019-03-28 | Stop reason: HOSPADM

## 2019-03-27 RX ORDER — SODIUM CHLORIDE 0.9 % (FLUSH) 0.9 %
10 SYRINGE (ML) INJECTION PRN
Status: DISCONTINUED | OUTPATIENT
Start: 2019-03-27 | End: 2019-03-28 | Stop reason: HOSPADM

## 2019-03-27 RX ADMIN — HYDRALAZINE HYDROCHLORIDE 20 MG: 20 INJECTION INTRAMUSCULAR; INTRAVENOUS at 21:48

## 2019-03-27 RX ADMIN — LABETALOL HYDROCHLORIDE 10 MG: 5 INJECTION, SOLUTION INTRAVENOUS at 21:39

## 2019-03-27 RX ADMIN — LABETALOL HYDROCHLORIDE 10 MG: 5 INJECTION, SOLUTION INTRAVENOUS at 21:44

## 2019-03-27 ASSESSMENT — PAIN DESCRIPTION - DESCRIPTORS: DESCRIPTORS: ACHING

## 2019-03-27 ASSESSMENT — PAIN DESCRIPTION - LOCATION: LOCATION: HEAD

## 2019-03-27 ASSESSMENT — PAIN DESCRIPTION - PAIN TYPE: TYPE: ACUTE PAIN

## 2019-03-27 ASSESSMENT — PAIN SCALES - GENERAL: PAINLEVEL_OUTOF10: 4

## 2019-03-28 PROCEDURE — 93010 ELECTROCARDIOGRAM REPORT: CPT | Performed by: INTERNAL MEDICINE

## 2019-04-01 LAB
EKG ATRIAL RATE: 59 BPM
EKG DIAGNOSIS: NORMAL
EKG P AXIS: 40 DEGREES
EKG P-R INTERVAL: 150 MS
EKG Q-T INTERVAL: 474 MS
EKG QRS DURATION: 96 MS
EKG QTC CALCULATION (BAZETT): 469 MS
EKG R AXIS: -17 DEGREES
EKG T AXIS: 51 DEGREES
EKG VENTRICULAR RATE: 59 BPM

## 2019-04-12 ENCOUNTER — HOSPITAL ENCOUNTER (OUTPATIENT)
Dept: ULTRASOUND IMAGING | Age: 74
Discharge: HOME | End: 2019-04-12
Payer: MEDICARE

## 2019-04-12 DIAGNOSIS — I65.23 BILATERAL CAROTID ARTERY STENOSIS: ICD-10-CM

## 2019-04-12 PROCEDURE — 93880 EXTRACRANIAL BILAT STUDY: CPT

## 2019-08-09 ENCOUNTER — APPOINTMENT (OUTPATIENT)
Dept: GENERAL RADIOLOGY | Age: 74
End: 2019-08-09
Payer: MEDICARE

## 2019-08-09 ENCOUNTER — HOSPITAL ENCOUNTER (EMERGENCY)
Age: 74
Discharge: HOME | End: 2019-08-09
Attending: EMERGENCY MEDICINE
Payer: MEDICARE

## 2019-08-09 VITALS
OXYGEN SATURATION: 94 % | HEIGHT: 65 IN | DIASTOLIC BLOOD PRESSURE: 58 MMHG | BODY MASS INDEX: 36.15 KG/M2 | SYSTOLIC BLOOD PRESSURE: 171 MMHG | HEART RATE: 69 BPM | TEMPERATURE: 98.1 F | RESPIRATION RATE: 16 BRPM | WEIGHT: 217 LBS

## 2019-08-09 DIAGNOSIS — S42.91XA TRAUMATIC CLOSED DISPLACED FRACTURE OF RIGHT SHOULDER WITH ANTERIOR DISLOCATION, INITIAL ENCOUNTER: Primary | ICD-10-CM

## 2019-08-09 PROCEDURE — 99283 EMERGENCY DEPT VISIT LOW MDM: CPT

## 2019-08-09 PROCEDURE — 2580000003 HC RX 258: Performed by: EMERGENCY MEDICINE

## 2019-08-09 PROCEDURE — 96361 HYDRATE IV INFUSION ADD-ON: CPT

## 2019-08-09 PROCEDURE — 6360000002 HC RX W HCPCS: Performed by: PHYSICIAN ASSISTANT

## 2019-08-09 PROCEDURE — 73030 X-RAY EXAM OF SHOULDER: CPT

## 2019-08-09 PROCEDURE — 96374 THER/PROPH/DIAG INJ IV PUSH: CPT

## 2019-08-09 PROCEDURE — 99152 MOD SED SAME PHYS/QHP 5/>YRS: CPT

## 2019-08-09 PROCEDURE — 6360000002 HC RX W HCPCS: Performed by: EMERGENCY MEDICINE

## 2019-08-09 PROCEDURE — 96375 TX/PRO/DX INJ NEW DRUG ADDON: CPT

## 2019-08-09 PROCEDURE — 73070 X-RAY EXAM OF ELBOW: CPT

## 2019-08-09 PROCEDURE — 94770 HC ETCO2 MONITOR DAILY: CPT

## 2019-08-09 PROCEDURE — 4500000029 HC MAJOR PROCEDURE

## 2019-08-09 RX ORDER — MORPHINE SULFATE 4 MG/ML
4 INJECTION, SOLUTION INTRAMUSCULAR; INTRAVENOUS EVERY 30 MIN PRN
Status: DISCONTINUED | OUTPATIENT
Start: 2019-08-09 | End: 2019-08-09 | Stop reason: HOSPADM

## 2019-08-09 RX ORDER — 0.9 % SODIUM CHLORIDE 0.9 %
1000 INTRAVENOUS SOLUTION INTRAVENOUS ONCE
Status: COMPLETED | OUTPATIENT
Start: 2019-08-09 | End: 2019-08-09

## 2019-08-09 RX ORDER — ONDANSETRON 2 MG/ML
4 INJECTION INTRAMUSCULAR; INTRAVENOUS EVERY 30 MIN PRN
Status: DISCONTINUED | OUTPATIENT
Start: 2019-08-09 | End: 2019-08-09 | Stop reason: HOSPADM

## 2019-08-09 RX ORDER — PROPOFOL 10 MG/ML
200 INJECTION, EMULSION INTRAVENOUS ONCE
Status: COMPLETED | OUTPATIENT
Start: 2019-08-09 | End: 2019-08-09

## 2019-08-09 RX ADMIN — SODIUM CHLORIDE 1000 ML: 9 INJECTION, SOLUTION INTRAVENOUS at 14:01

## 2019-08-09 RX ADMIN — ONDANSETRON 4 MG: 2 INJECTION INTRAMUSCULAR; INTRAVENOUS at 12:54

## 2019-08-09 RX ADMIN — PROPOFOL 200 MG: 10 INJECTION, EMULSION INTRAVENOUS at 14:01

## 2019-08-09 RX ADMIN — MORPHINE SULFATE 4 MG: 4 INJECTION, SOLUTION INTRAMUSCULAR; INTRAVENOUS at 12:54

## 2019-08-09 ASSESSMENT — PAIN DESCRIPTION - PAIN TYPE: TYPE: ACUTE PAIN

## 2019-08-09 ASSESSMENT — PAIN DESCRIPTION - LOCATION: LOCATION: SHOULDER

## 2019-08-09 ASSESSMENT — PAIN DESCRIPTION - DESCRIPTORS: DESCRIPTORS: CONSTANT

## 2019-08-09 ASSESSMENT — PAIN SCALES - GENERAL
PAINLEVEL_OUTOF10: 10
PAINLEVEL_OUTOF10: 10

## 2019-08-09 ASSESSMENT — PAIN DESCRIPTION - ORIENTATION: ORIENTATION: RIGHT

## 2019-08-09 NOTE — ED NOTES
Fish and manoj placed per Dr. David Wang and Joie Apgar PA Rosella Estes D Snapp-Solomon, RN  [phone]

## 2019-08-09 NOTE — ED PROVIDER NOTES
Patient Identification  Rubina Hall is a 66 y.o. female    Chief Complaint  Shoulder Injury (right shoulder pain after falling up 2 steps; per EMS possible dislocation) and Shoulder Pain      HPI  (History provided by patient)  This is a 66 y.o. female who was brought in by self for chief complaint of shoulder injury. Patient reports that she was walking up 2 steps and fell, landed on her right shoulder, now has tenderness and pain in right shoulder and deformity. This is never happened before. She reports unable to move arm. EMS reported that arm was dislocated. Patient denies any numbness or tingling. Denies hitting her head. No neck or back pain. No rib pain. No hip pain. REVIEW OF SYSTEMS    Constitutional:  Denies fever, chills  HENT:  Denies sore throat or ear pain   Eyes: Denies vision changes, eye pain  Cardiovascular:  Denies chest pain, syncope  Respiratory:  Denies shortness of breath, cough   GI:  Denies abdominal pain, nausea, vomiting  :  Denies dysuria, discharge  Musculoskeletal:  Denies back pain. + shoulder pain  Skin:  Denies rash, pruritis  Neurologic:  Denies headache, focal weakness, or sensory changes     See HPI and nursing notes for additional information     I have reviewed the following nursing documentation:  Allergies: No Known Allergies    Past medical history:  has a past medical history of Basal cell carcinoma of skin (7/1/2013), Diabetes mellitus ([facility] [address] Diverticulosis, GERD (gastroesophageal reflux disease), Hiatal hernia, History of nuclear stress test (09/06/2018), Hyperlipidemia, Hypertension, Pap smear for cervical cancer screening (03/31/2009), Pap smear for cervical cancer screening (07/19/2010), Pap smear for cervical cancer screening (09/12/2011), Respiratory disorder, Sleep apnea, Stroke, acute, thrombotic (12/17/2011), and Venous (peripheral) insufficiency (7/2013).     Past surgical history:  has a past surgical history that includes colectomy; Umbilical hernia repair (2005); Skin cancer excision (7/1/2013); Inguinal hernia repair (Right); Arm Surgery (Left); Bunionectomy (Left); other surgical history (Right, 8/28/15); Cholecystectomy; Ankle fracture surgery (Right); Colonoscopy; and Colonoscopy (11/02/2017). Home medications:   Prior to Admission medications    Medication Sig Start Date End Date Taking? Authorizing Provider   glimepiride (AMARYL) 2 MG tablet TAKE 2 TABLETS BY MOUTH EVERY MORNING 6/2/18   Historical Provider, MD   betamethasone dipropionate (DIPROLENE) 0.05 % cream APPLY TO AREAS OF RASH ON BACKSIDE AND ABDOMEN TWICE A DAY AS NEEDED 8/6/18   Historical Provider, MD   citalopram (CELEXA) 20 MG tablet TAKE 1 TABLET BY MOUTH EVERY DAY 7/3/18   Historical Provider, MD   hydrOXYzine (ATARAX) 10 MG tablet TAKE ONE PILL BY MOUTH EVERY 4-6 HOURS AS NEEDED FOR [facility] 8/6/18   Historical Provider, MD   amLODIPine (NORVASC) 5 MG tablet Take 1 tablet by mouth daily 8/9/18   Mj Cabrales MD   oxyCODONE-acetaminophen (PERCOCET) 5-325 MG per tablet every 8 hours as needed. . 4/15/16   Historical Provider, MD   ibuprofen (ADVIL;MOTRIN) 600 MG tablet Take 1 tablet by mouth every 6 hours as needed for Pain 8/22/15   Chris Solis MD   clopidogrel (PLAVIX) 75 MG tablet TAKE 1 TABLET DAILY 12/12/14   Radha Elias MD   carvedilol (COREG) 25 MG tablet Take 1 tablet by mouth daily. 7/20/14   Historical Provider, MD   losartan-hydrochlorothiazide (HYZAAR) 100-25 MG per tablet Take 1 tablet by mouth daily. Historical Provider, MD   albuterol (PROVENTIL HFA;VENTOLIN HFA) 108 (90 BASE) MCG/ACT inhaler Inhale 2 puffs into the lungs every 6 hours as needed for Wheezing.  5/15/13 9/2/14  Radha Elias MD   pantoprazole (PROTONIX) 40 MG tablet Take 40 mg by mouth daily  9/23/13   Historical Provider, MD   metformin (GLUCOPHAGE) 500 MG tablet TAKE 1 TABLET BY MOUTH TWICE A DAY WITH MEALS  Patient taking differently: TAKE 2 TABLETS BY MOUTH TWICE A DAY WITH MEALS 6/21/13   Adiel Golden MD   insulin glargine (LANTUS SOLOSTAR) 100 UNIT/ML injection INJECT 48 UNITS UNDER THE SKIN NIGHTLY 5/15/13   Adiel Golden MD   Insulin Pen Needle (UNIFINE PENTIPS) 31G X 8 MM MISC Injects once daily 5/15/13   Adiel Golden MD   atorvastatin (LIPITOR) 20 MG tablet Take 1 tablet by mouth daily. 3/21/13   Va Sheppard MD       Social history:  reports that she has never smoked. She has never used smokeless tobacco. She reports that she drinks about 1.0 standard drinks of alcohol per week. She reports that she does not use drugs. Family history:    Family History   Problem Relation Age of Onset    Diabetes Mother 66    Coronary Art Dis Mother     Asthma Father     Diabetes Father 68         Exam  BP (!) 171/58   Pulse 69   Temp 98.1 °F (36.7 °C) (Oral)   Resp 16   Ht 5' 5\" (1.651 m)   Wt 217 lb (98.4 kg)   SpO2 94%   BMI 36.11 kg/m²   Nursing note and vitals reviewed. Constitutional: Well developed, well nourished. Mild distress due to pain. HENT:      Head: Normocephalic and atraumatic. Ears: External ears normal.      Nose: Nose normal.     Mouth: Membrane mucosa moist and pink. No posterior oropharynx erythema or tonsillar edema  Eyes: Anicteric sclera. No discharge, PERRL  Neck: Supple. Trachea midline. Cardiovascular: RRR, no murmurs, rubs, or gallops, radial pulses 2+ bilaterally. Pulmonary/Chest: Effort normal. No respiratory distress. CTAB. No stridor. No wheezes. No rales. Abdominal: Soft. Nontender to palpation. No distension. No guarding, rebound tenderness, or evidence of ascites. : No CVA tenderness. Musculoskeletal: Right shoulder joint shows possible deep sulcus sign, bulging of her anterior shoulder concerning for dislocation. Patient does not tolerate any range of motion of shoulder joint. No tenderness to palpation of the cervical, thoracic, lumbar spine or paraspinal muscles.   Remainder of right upper extremity nontender. Pelvis stable and nontender. No pain with logrolling of both legs. Neurological: Alert and oriented to person, place, and time. Normal muscle tone.  strength 5 out of 5 on the right. Sensation light touch intact of the right upper extremity  Skin: Warm and dry. No rash    Procedure Note - Joint Reduction: The benefits, risks, and alternatives of shoulder reduction were discussed with the patient. Questions were sought and answered. Written consent was given for the procedure. Prior to joint reduction a time out with nursing was called. Krystle Farmer was given Propofol by Dr. Gerry Ramos, please see her note for specific dosage, and procedural pain control was adequately achieved. The dislocation and/or fracture was reduced to the best of my abilities utilizing flexion and traction, reduced with minimal manipulation. Following reduction, immobilization was performed and the extremity's neurovascular status was re-checked and was unchanged from the pre-procedure exam. The patient tolerated the procedure without complications. Instructions were given to return immediately for increasing pain, new numbness or weakness, a cold/pale extremity or any other worsening or worrisome concerns. Radiographs (if obtained):  [] The following radiograph was interpreted by myself in the absence of a radiologist:   [x] Radiologist's Report Reviewed:  XR SHOULDER RIGHT (MIN 2 VIEWS)   Preliminary Result   Successful reduction. XR ELBOW RIGHT (2 VIEWS)   Final Result   1. No acute abnormality involving the right elbow. XR SHOULDER RIGHT (MIN 2 VIEWS)   Final Result   1. Anterior and inferior glenohumeral joint dislocation. Labs  No results found for this visit on 08/09/19. MDM  Patient presents for fall, shoulder pain. Appears dislocated on exam.  Neurovascular exam is intact. X-ray confirms dislocation. Reduced as noted above. Placed in sling and swath.

## 2019-09-09 ENCOUNTER — APPOINTMENT (OUTPATIENT)
Dept: GENERAL RADIOLOGY | Age: 74
End: 2019-09-09
Payer: MEDICARE

## 2019-09-09 ENCOUNTER — HOSPITAL ENCOUNTER (EMERGENCY)
Age: 74
Discharge: HOME | End: 2019-09-09
Attending: EMERGENCY MEDICINE
Payer: MEDICARE

## 2019-09-09 VITALS
SYSTOLIC BLOOD PRESSURE: 187 MMHG | WEIGHT: 200 LBS | TEMPERATURE: 97.7 F | HEART RATE: 69 BPM | HEIGHT: 65 IN | OXYGEN SATURATION: 96 % | DIASTOLIC BLOOD PRESSURE: 81 MMHG | RESPIRATION RATE: 16 BRPM | BODY MASS INDEX: 33.32 KG/M2

## 2019-09-09 DIAGNOSIS — S43.014A ANTERIOR DISLOCATION OF RIGHT SHOULDER, INITIAL ENCOUNTER: Primary | ICD-10-CM

## 2019-09-09 DIAGNOSIS — M21.821 HILL SACHS DEFORMITY, RIGHT: ICD-10-CM

## 2019-09-09 LAB
ANION GAP SERPL CALCULATED.3IONS-SCNC: 14 MMOL/L (ref 4–16)
BASOPHILS ABSOLUTE: 0.1 K/CU MM
BASOPHILS RELATIVE PERCENT: 0.4 % (ref 0–1)
BUN BLDV-MCNC: 18 MG/DL (ref 6–23)
CALCIUM SERPL-MCNC: 9.1 MG/DL (ref 8.3–10.6)
CHLORIDE BLD-SCNC: 99 MMOL/L (ref 99–110)
CO2: 27 MMOL/L (ref 21–32)
CREAT SERPL-MCNC: 0.8 MG/DL (ref 0.6–1.1)
DIFFERENTIAL TYPE: ABNORMAL
EOSINOPHILS ABSOLUTE: 0.2 K/CU MM
EOSINOPHILS RELATIVE PERCENT: 1.9 % (ref 0–3)
GFR AFRICAN AMERICAN: >60 ML/MIN/1.73M2
GFR NON-AFRICAN AMERICAN: >60 ML/MIN/1.73M2
GLUCOSE BLD-MCNC: 168 MG/DL (ref 70–99)
HCT VFR BLD CALC: 34.8 % (ref 37–47)
HEMOGLOBIN: 11.5 GM/DL (ref 12.5–16)
IMMATURE NEUTROPHIL %: 0.7 % (ref 0–0.43)
LYMPHOCYTES ABSOLUTE: 2.1 K/CU MM
LYMPHOCYTES RELATIVE PERCENT: 17.5 % (ref 24–44)
MCH RBC QN AUTO: 31.1 PG (ref 27–31)
MCHC RBC AUTO-ENTMCNC: 33 % (ref 32–36)
MCV RBC AUTO: 94.1 FL (ref 78–100)
MONOCYTES ABSOLUTE: 0.8 K/CU MM
MONOCYTES RELATIVE PERCENT: 6.4 % (ref 0–4)
NUCLEATED RBC %: 0 %
PDW BLD-RTO: 15.2 % (ref 11.7–14.9)
PLATELET # BLD: 276 K/CU MM (ref 140–440)
PMV BLD AUTO: 10.9 FL (ref 7.5–11.1)
POTASSIUM SERPL-SCNC: 3.5 MMOL/L (ref 3.5–5.1)
RBC # BLD: 3.7 M/CU MM (ref 4.2–5.4)
SEGMENTED NEUTROPHILS ABSOLUTE COUNT: 8.7 K/CU MM
SEGMENTED NEUTROPHILS RELATIVE PERCENT: 73.1 % (ref 36–66)
SODIUM BLD-SCNC: 140 MMOL/L (ref 135–145)
TOTAL IMMATURE NEUTOROPHIL: 0.08 K/CU MM
TOTAL NUCLEATED RBC: 0 K/CU MM
WBC # BLD: 11.9 K/CU MM (ref 4–10.5)

## 2019-09-09 PROCEDURE — 99152 MOD SED SAME PHYS/QHP 5/>YRS: CPT

## 2019-09-09 PROCEDURE — 85025 COMPLETE CBC W/AUTO DIFF WBC: CPT

## 2019-09-09 PROCEDURE — 4500000029 HC MAJOR PROCEDURE

## 2019-09-09 PROCEDURE — 73030 X-RAY EXAM OF SHOULDER: CPT

## 2019-09-09 PROCEDURE — 96374 THER/PROPH/DIAG INJ IV PUSH: CPT

## 2019-09-09 PROCEDURE — 80048 BASIC METABOLIC PNL TOTAL CA: CPT

## 2019-09-09 PROCEDURE — 99283 EMERGENCY DEPT VISIT LOW MDM: CPT

## 2019-09-09 PROCEDURE — 6360000002 HC RX W HCPCS: Performed by: EMERGENCY MEDICINE

## 2019-09-09 RX ORDER — PROPOFOL 10 MG/ML
1 INJECTION, EMULSION INTRAVENOUS ONCE
Status: COMPLETED | OUTPATIENT
Start: 2019-09-09 | End: 2019-09-09

## 2019-09-09 RX ORDER — HYDROCODONE BITARTRATE AND ACETAMINOPHEN 5; 325 MG/1; MG/1
1 TABLET ORAL EVERY 6 HOURS PRN
Qty: 14 TABLET | Refills: 0 | Status: SHIPPED | OUTPATIENT
Start: 2019-09-09 | End: 2019-09-12

## 2019-09-09 RX ORDER — MORPHINE SULFATE 4 MG/ML
4 INJECTION, SOLUTION INTRAMUSCULAR; INTRAVENOUS ONCE
Status: DISCONTINUED | OUTPATIENT
Start: 2019-09-09 | End: 2019-09-09 | Stop reason: HOSPADM

## 2019-09-09 RX ORDER — MORPHINE SULFATE 4 MG/ML
4 INJECTION, SOLUTION INTRAMUSCULAR; INTRAVENOUS ONCE
Status: COMPLETED | OUTPATIENT
Start: 2019-09-09 | End: 2019-09-09

## 2019-09-09 RX ORDER — SODIUM CHLORIDE 9 MG/ML
INJECTION, SOLUTION INTRAVENOUS
Status: DISCONTINUED
Start: 2019-09-09 | End: 2019-09-09 | Stop reason: HOSPADM

## 2019-09-09 RX ADMIN — PROPOFOL 30 MG: 10 INJECTION, EMULSION INTRAVENOUS at 12:45

## 2019-09-09 RX ADMIN — MORPHINE SULFATE 4 MG: 4 INJECTION, SOLUTION INTRAMUSCULAR; INTRAVENOUS at 10:12

## 2019-09-09 RX ADMIN — PROPOFOL 80 MG: 10 INJECTION, EMULSION INTRAVENOUS at 12:42

## 2019-09-09 ASSESSMENT — PAIN DESCRIPTION - LOCATION: LOCATION: SHOULDER

## 2019-09-09 ASSESSMENT — PAIN DESCRIPTION - ORIENTATION: ORIENTATION: RIGHT

## 2019-09-09 ASSESSMENT — PAIN DESCRIPTION - PAIN TYPE: TYPE: ACUTE PAIN

## 2019-09-09 ASSESSMENT — PAIN SCALES - GENERAL: PAINLEVEL_OUTOF10: 9

## 2019-09-09 NOTE — ED PROVIDER NOTES
[address] [facility]  eMERGENCYdEPARTMENT eNCOUnter      Pt Name: Edward Francisco  MRN: [id number]  [place] 1945  Date of evaluation: 9/9/2019  Provider:Ishaan Velazquez MD    [address] [place] [address]       Chief Complaint   Patient presents with    Shoulder Injury     Pt reports she woke up at 0400 this morning, with R shoulder out of place         HISTORY OF PRESENT ILLNESS    Edward Francisco is a 66 y.o. female who presents to the emergency department with shoulder dislocation. The patient rolled over in bed at 4 this morning, dislocated her shoulder. She is had this frequently. She is a 9 out of 10 pain, worse with movement, not better with anything. She can feel her fingers, she can move her fingers, has not take anything for pain. Nursing Notes were reviewed. REVIEW OF SYSTEMS       Review of Systems    10 point review of systems was performed and was negative exceptas specifically noted in the HPI. PAST MEDICAL HISTORY     Past Medical History:   Diagnosis Date    Basal cell carcinoma of skin 7/1/2013    right foot-Dr Esteban    Diabetes mellitus ([facility] [address]     Diverticulosis     GERD (gastroesophageal reflux disease)     Hiatal hernia     History of nuclear stress test 09/06/2018    Lexiscan- EF 63%, No infarct or ischemia, normal study.     Hyperlipidemia     Hypertension     Pap smear for cervical cancer screening 03/31/2009    Neg    Pap smear for cervical cancer screening 07/19/2010    Neg    Pap smear for cervical cancer screening 09/12/2011    Neg    Respiratory disorder     small airway disorder    Sleep apnea     CPAP at 9.0 cm    Stroke, acute, thrombotic 12/17/2011    Venous (peripheral) insufficiency 7/2013    right greater saphenous vein, Left small saphenous vein-Dr Esteban         SURGICAL HISTORY       Past Surgical History:   Procedure Laterality Date    ANKLE FRACTURE SURGERY Right     ARM SURGERY Left     BUNIONECTOMY Left Problem Relation Age of Onset    Diabetes Mother 66    Coronary Art Dis Mother     Asthma Father     Diabetes Father 68          SOCIAL HISTORY       Social History     Socioeconomic History    Marital status:      Spouse name: None    Number of children: None    Years of education: None    Highest education level: None   Occupational History    None   Social Needs    Financial resource strain: None    Food insecurity:     Worry: None     Inability: None    Transportation needs:     Medical: None     Non-medical: None   Tobacco Use    Smoking status: Never Smoker    Smokeless tobacco: Never Used   Substance and Sexual Activity    Alcohol use: Yes     Alcohol/week: 1.0 standard drinks     Types: 1 Cans of beer per week     Comment: rarely    Drug use: No    Sexual activity: Never     Comment: defferd   Lifestyle    Physical activity:     Days per week: None     Minutes per session: None    Stress: None   Relationships    Social connections:     Talks on phone: None     Gets together: None     Attends [employer] service: None     Active member of club or organization: None     Attends meetings of clubs or organizations: None     Relationship status: None    Intimate partner violence:     Fear of current or ex partner: None     Emotionally abused: None     Physically abused: None     Forced sexual activity: None   Other Topics Concern    None   Social History Narrative    None       SCREENINGS   [email or web address] Coma Scale  Eye Opening: Spontaneous  Best Verbal Response: Oriented  Best Motor Response: Obeys commands  [place] Coma Scale Score: 15        PHYSICAL EXAM       ED Triage Vitals [09/09/19 0947]   BP Temp Temp Source Pulse Resp SpO2 Height Weight   (!) 207/92 97.7 °F (36.5 °C) Oral 64 16 95 % 5' 5\" (1.651 m) 200 lb (90.7 kg)       Physical Exam  General appearance: Alert, cooperative, no distress, appears stated age.   Head:  Normocephalic, without obvious abnormality, 187/81   Pulse: 76 63 70 69   Resp: 19 17 16    Temp:       TempSrc:       SpO2: 100% 99% 99% 96%   Weight:       Height:           MDM  Patient presents with shoulder dislocation. Vital signs stable on examination likely dislocation. XR confirms dislocation. Sedated and relocated with 110mg propofol as above. XR shows reduced dislocation. See KODY note for relocation procedure. See above for sedation procedure. Will dc pt to home with ortho followup due to her hill sachs deformity. REASSESSMENT          CRITICAL CARE TIME   Total Critical Care time was 0 minutes, excluding separately reportable procedures. There was a high probability of clinically significant/life threatening deteriorationin the patient's condition which required my urgent intervention.       CONSULTS:  None     PROCEDURES:  Unless otherwise noted below, none     Procedural sedation  Date/Time: 9/9/2019 2:57 PM  Performed by: Merced George MD  Authorized by: Merced George MD     Consent:     Consent obtained:  Written    Consent given by:  Patient  Indications:     Procedure performed:  Dislocation reduction    Procedure necessitating sedation performed by:  Different physician    Intended level of sedation:  Moderate (conscious sedation)  Pre-sedation assessment:     ASA classification: class 2 - patient with mild systemic disease      Neck mobility: normal      Mouth opening:  3 or more finger widths    Thyromental distance:  4 finger widths    Mallampati score:  II - soft palate, uvula, fauces visible    Pre-sedation assessments completed and reviewed: airway patency, mental status, respiratory function and temperature      History of difficult intubation: no    Immediate pre-procedure details:     Reassessment: Patient reassessed immediately prior to procedure      Reviewed: vital signs      Verified: bag valve mask available, emergency equipment available, intubation equipment available, IV patency confirmed, oxygen available and suction available    Procedure details (see MAR for exact dosages):     Preoxygenation:  Nonrebreather mask    Sedation:  Propofol    Intra-procedure monitoring:  Blood pressure monitoring, cardiac monitor, continuous capnometry, continuous pulse oximetry, frequent LOC assessments and frequent vital sign checks    Intra-procedure events: none    Post-procedure details:     Post-sedation assessments completed and reviewed: airway patency, cardiovascular function, mental status, nausea/vomiting, pain level and respiratory function      Specimens recovered:  None    Patient is stable for discharge or admission: yes      Patient tolerance: Tolerated well, no immediate complications        FINAL IMPRESSION      1. Anterior dislocation of right shoulder, initial encounter    2. Hill Sachs deformity, right          DISPOSITION/PLAN   DISPOSITION Decision To Discharge 09/09/2019 02:30:44 PM      PATIENT REFERRED TO:  No follow-up provider specified. DISCHARGE MEDICATIONS:  New Prescriptions    HYDROCODONE-ACETAMINOPHEN (NORCO) 5-325 MG PER TABLET    Take 1 tablet by mouth every 6 hours as needed for Pain for up to 3 days.           (Please note that portions of this note were completed with a voicerecognition program.  Efforts were made to edit the dictations but occasionally words are mis-transcribed.)    Sanjana Louis MD (electronically signed)  Attending Emergency Physician            Sanjana Louis MD  09/09/19 [id number]

## 2019-09-09 NOTE — ED NOTES
Pt to be moved to TR01 for conscious sedation. Report given to [facility].      Gatito Scruggs RN  09/09/19 9989

## 2020-04-28 ENCOUNTER — HOSPITAL ENCOUNTER (OUTPATIENT)
Dept: CT IMAGING | Age: 75
Discharge: HOME | End: 2020-04-28
Payer: MEDICARE

## 2020-04-28 LAB
GFR AFRICAN AMERICAN: >60 ML/MIN/1.73M2
GFR NON-AFRICAN AMERICAN: 54 ML/MIN/1.73M2
POC CREATININE: 1 MG/DL (ref 0.6–1.1)

## 2020-04-28 PROCEDURE — 2580000003 HC RX 258: Performed by: SURGERY

## 2020-04-28 PROCEDURE — 6360000004 HC RX CONTRAST MEDICATION: Performed by: SURGERY

## 2020-04-28 PROCEDURE — 74177 CT ABD & PELVIS W/CONTRAST: CPT

## 2020-04-28 RX ORDER — SODIUM CHLORIDE 0.9 % (FLUSH) 0.9 %
10 SYRINGE (ML) INJECTION ONCE
Status: COMPLETED | OUTPATIENT
Start: 2020-04-28 | End: 2020-04-28

## 2020-04-28 RX ADMIN — Medication 10 ML: at 10:43

## 2020-04-28 RX ADMIN — IOHEXOL 50 ML: 240 INJECTION, SOLUTION INTRATHECAL; INTRAVASCULAR; INTRAVENOUS; ORAL at 10:43

## 2020-04-28 RX ADMIN — IOPAMIDOL 75 ML: 755 INJECTION, SOLUTION INTRAVENOUS at 10:43

## 2021-11-01 ENCOUNTER — HOSPITAL ENCOUNTER (OUTPATIENT)
Age: 76
Setting detail: SPECIMEN
Discharge: HOME | End: 2021-11-01
Payer: MEDICARE

## 2021-11-01 LAB
FLUID TYPE: NORMAL INDEX
LYMPHOCYTES, BODY FLUID: 34 %
MESOTHELIAL FLUID: 0 /100 WBC
MONOCYTE, FLUID: 9 %
NEUTROPHIL, FLUID: 57 %
OTHER CELLS FLUID: 0
RBC FLUID: 334 /CU MM
WBC FLUID: 264 /CU MM

## 2021-11-01 PROCEDURE — 87070 CULTURE OTHR SPECIMN AEROBIC: CPT

## 2021-11-01 PROCEDURE — 89051 BODY FLUID CELL COUNT: CPT

## 2021-11-01 PROCEDURE — 87205 SMEAR GRAM STAIN: CPT

## 2021-11-01 PROCEDURE — 87075 CULTR BACTERIA EXCEPT BLOOD: CPT

## 2021-11-01 PROCEDURE — 89060 EXAM SYNOVIAL FLUID CRYSTALS: CPT

## 2021-11-02 LAB — CRYSTALS, FLUID: NORMAL

## 2021-11-22 LAB
CULTURE: NORMAL
Lab: NORMAL
SPECIMEN: NORMAL

## 2022-01-19 ENCOUNTER — HOSPITAL ENCOUNTER (OUTPATIENT)
Age: 77
Setting detail: SPECIMEN
Discharge: HOME | End: 2022-01-19
Payer: MEDICARE

## 2022-01-19 LAB
FLUID TYPE: NORMAL INDEX
LYMPHOCYTES, BODY FLUID: 73 %
MESOTHELIAL FLUID: 0 /100 WBC
MONOCYTE, FLUID: 19 %
NEUTROPHIL, FLUID: 8 %
OTHER CELLS FLUID: 0
RBC FLUID: 2000 /CU MM
WBC FLUID: 342 /CU MM

## 2022-01-19 PROCEDURE — 87076 CULTURE ANAEROBE IDENT EACH: CPT

## 2022-01-19 PROCEDURE — 89060 EXAM SYNOVIAL FLUID CRYSTALS: CPT

## 2022-01-19 PROCEDURE — 87075 CULTR BACTERIA EXCEPT BLOOD: CPT

## 2022-01-19 PROCEDURE — 89051 BODY FLUID CELL COUNT: CPT

## 2022-01-19 PROCEDURE — 87205 SMEAR GRAM STAIN: CPT

## 2022-01-19 PROCEDURE — 87070 CULTURE OTHR SPECIMN AEROBIC: CPT

## 2022-01-20 LAB — CRYSTALS, FLUID: NORMAL

## 2022-02-03 LAB
CULTURE: ABNORMAL
CULTURE: ABNORMAL
GRAM SMEAR: ABNORMAL
GRAM SMEAR: ABNORMAL
Lab: ABNORMAL
SPECIMEN: ABNORMAL

## 2022-03-11 ENCOUNTER — HOSPITAL ENCOUNTER (OUTPATIENT)
Age: 77
Setting detail: SPECIMEN
Discharge: HOME | End: 2022-03-11
Payer: MEDICARE

## 2022-03-11 LAB
POLARIZED LIGHT EXAM: ABNORMAL
RBC, SYNOVIAL FLUID: ABNORMAL CU MM
SYN LYMPH: 69 %
SYN OTHER CELLS: ABNORMAL %
SYN SEGMENTED: 2 %
SYN WBC COUNT: 509 CU MM (ref 0–200)

## 2022-03-11 PROCEDURE — 87070 CULTURE OTHR SPECIMN AEROBIC: CPT

## 2022-03-11 PROCEDURE — 87205 SMEAR GRAM STAIN: CPT

## 2022-03-11 PROCEDURE — 89051 BODY FLUID CELL COUNT: CPT

## 2022-03-11 PROCEDURE — 87076 CULTURE ANAEROBE IDENT EACH: CPT

## 2022-03-11 PROCEDURE — 89060 EXAM SYNOVIAL FLUID CRYSTALS: CPT

## 2022-03-25 LAB
CULTURE: ABNORMAL
CULTURE: ABNORMAL
GRAM SMEAR: ABNORMAL
Lab: ABNORMAL
SPECIMEN: ABNORMAL

## 2022-04-22 ENCOUNTER — HOSPITAL ENCOUNTER (OUTPATIENT)
Age: 77
Setting detail: SPECIMEN
Discharge: HOME | End: 2022-04-22

## 2022-04-22 LAB
ALBUMIN SERPL-MCNC: 3.2 GM/DL (ref 3.4–5)
ALP BLD-CCNC: 181 IU/L (ref 40–128)
ALT SERPL-CCNC: 105 U/L (ref 10–40)
ANION GAP SERPL CALCULATED.3IONS-SCNC: 13 MMOL/L (ref 4–16)
AST SERPL-CCNC: 38 IU/L (ref 15–37)
BILIRUB SERPL-MCNC: 0.5 MG/DL (ref 0–1)
BUN BLDV-MCNC: 15 MG/DL (ref 6–23)
CALCIUM SERPL-MCNC: 8.4 MG/DL (ref 8.3–10.6)
CHLORIDE BLD-SCNC: 98 MMOL/L (ref 99–110)
CO2: 26 MMOL/L (ref 21–32)
CREAT SERPL-MCNC: 1.1 MG/DL (ref 0.6–1.1)
GFR AFRICAN AMERICAN: 58 ML/MIN/1.73M2
GFR NON-AFRICAN AMERICAN: 48 ML/MIN/1.73M2
GLUCOSE BLD-MCNC: 180 MG/DL (ref 70–99)
HCT VFR BLD CALC: 25.8 % (ref 37–47)
HEMOGLOBIN: 7.8 GM/DL (ref 12.5–16)
MCH RBC QN AUTO: 27 PG (ref 27–31)
MCHC RBC AUTO-ENTMCNC: 30.2 % (ref 32–36)
MCV RBC AUTO: 89.3 FL (ref 78–100)
PDW BLD-RTO: 15.1 % (ref 11.7–14.9)
PLATELET # BLD: 288 K/CU MM (ref 140–440)
PMV BLD AUTO: 11.9 FL (ref 7.5–11.1)
POTASSIUM SERPL-SCNC: 3.5 MMOL/L (ref 3.5–5.1)
RBC # BLD: 2.89 M/CU MM (ref 4.2–5.4)
SODIUM BLD-SCNC: 137 MMOL/L (ref 135–145)
TOTAL PROTEIN: 5.1 GM/DL (ref 6.4–8.2)
WBC # BLD: 12 K/CU MM (ref 4–10.5)

## 2022-04-22 PROCEDURE — 86480 TB TEST CELL IMMUN MEASURE: CPT

## 2022-04-22 PROCEDURE — 36415 COLL VENOUS BLD VENIPUNCTURE: CPT

## 2022-04-22 PROCEDURE — 80053 COMPREHEN METABOLIC PANEL: CPT

## 2022-04-22 PROCEDURE — 85027 COMPLETE CBC AUTOMATED: CPT

## 2022-04-25 LAB
QUANTI TB1 MINUS NIL: 0 IU/ML (ref 0–0.34)
QUANTI TB2 MINUS NIL: 0 IU/ML (ref 0–0.34)
QUANTIFERON (R) TB GOLD (INCUBATED): NEGATIVE IU/ML
QUANTIFERON MITOGEN MINUS NIL: >10 IU/ML
QUANTIFERON NIL: 0.02 IU/ML

## 2022-04-26 ENCOUNTER — HOSPITAL ENCOUNTER (OUTPATIENT)
Age: 77
Setting detail: SPECIMEN
Discharge: HOME | End: 2022-04-26

## 2022-04-26 LAB
ALBUMIN SERPL-MCNC: 3.6 GM/DL (ref 3.4–5)
ALP BLD-CCNC: 126 IU/L (ref 40–128)
ALT SERPL-CCNC: 31 U/L (ref 10–40)
ANION GAP SERPL CALCULATED.3IONS-SCNC: 16 MMOL/L (ref 4–16)
AST SERPL-CCNC: 10 IU/L (ref 15–37)
BILIRUB SERPL-MCNC: 0.6 MG/DL (ref 0–1)
BUN BLDV-MCNC: 15 MG/DL (ref 6–23)
CALCIUM SERPL-MCNC: 8.6 MG/DL (ref 8.3–10.6)
CHLORIDE BLD-SCNC: 95 MMOL/L (ref 99–110)
CO2: 25 MMOL/L (ref 21–32)
CREAT SERPL-MCNC: 1 MG/DL (ref 0.6–1.1)
GFR AFRICAN AMERICAN: >60 ML/MIN/1.73M2
GFR NON-AFRICAN AMERICAN: 54 ML/MIN/1.73M2
GLUCOSE BLD-MCNC: 238 MG/DL (ref 70–99)
HCT VFR BLD CALC: 27 % (ref 37–47)
HEMOGLOBIN: 7.9 GM/DL (ref 12.5–16)
HEPATITIS B SURFACE ANTIGEN: NON REACTIVE
HEPATITIS C ANTIBODY: NON REACTIVE
MCH RBC QN AUTO: 26.5 PG (ref 27–31)
MCHC RBC AUTO-ENTMCNC: 29.3 % (ref 32–36)
MCV RBC AUTO: 90.6 FL (ref 78–100)
PDW BLD-RTO: 16.1 % (ref 11.7–14.9)
PLATELET # BLD: 361 K/CU MM (ref 140–440)
PMV BLD AUTO: 11.2 FL (ref 7.5–11.1)
POTASSIUM SERPL-SCNC: 4 MMOL/L (ref 3.5–5.1)
RBC # BLD: 2.98 M/CU MM (ref 4.2–5.4)
SODIUM BLD-SCNC: 136 MMOL/L (ref 135–145)
TOTAL PROTEIN: 5.9 GM/DL (ref 6.4–8.2)
WBC # BLD: 8.9 K/CU MM (ref 4–10.5)

## 2022-04-26 PROCEDURE — 36415 COLL VENOUS BLD VENIPUNCTURE: CPT

## 2022-04-26 PROCEDURE — 85027 COMPLETE CBC AUTOMATED: CPT

## 2022-04-26 PROCEDURE — 87340 HEPATITIS B SURFACE AG IA: CPT

## 2022-04-26 PROCEDURE — 86803 HEPATITIS C AB TEST: CPT

## 2022-04-26 PROCEDURE — 80053 COMPREHEN METABOLIC PANEL: CPT

## 2022-05-03 ENCOUNTER — HOSPITAL ENCOUNTER (OUTPATIENT)
Age: 77
Setting detail: SPECIMEN
Discharge: HOME | End: 2022-05-03

## 2022-05-03 LAB
ALBUMIN SERPL-MCNC: 3.6 GM/DL (ref 3.4–5)
ALP BLD-CCNC: 96 IU/L (ref 40–128)
ALT SERPL-CCNC: 10 U/L (ref 10–40)
ANION GAP SERPL CALCULATED.3IONS-SCNC: 14 MMOL/L (ref 4–16)
AST SERPL-CCNC: 9 IU/L (ref 15–37)
BILIRUB SERPL-MCNC: 0.3 MG/DL (ref 0–1)
BUN BLDV-MCNC: 11 MG/DL (ref 6–23)
CALCIUM SERPL-MCNC: 8.4 MG/DL (ref 8.3–10.6)
CHLORIDE BLD-SCNC: 102 MMOL/L (ref 99–110)
CO2: 25 MMOL/L (ref 21–32)
CREAT SERPL-MCNC: 0.9 MG/DL (ref 0.6–1.1)
GFR AFRICAN AMERICAN: >60 ML/MIN/1.73M2
GFR NON-AFRICAN AMERICAN: >60 ML/MIN/1.73M2
GLUCOSE BLD-MCNC: 115 MG/DL (ref 70–99)
HCT VFR BLD CALC: 25.8 % (ref 37–47)
HEMOGLOBIN: 7.5 GM/DL (ref 12.5–16)
MCH RBC QN AUTO: 27.3 PG (ref 27–31)
MCHC RBC AUTO-ENTMCNC: 29.1 % (ref 32–36)
MCV RBC AUTO: 93.8 FL (ref 78–100)
PDW BLD-RTO: 19 % (ref 11.7–14.9)
PLATELET # BLD: 318 K/CU MM (ref 140–440)
PMV BLD AUTO: 11.2 FL (ref 7.5–11.1)
POTASSIUM SERPL-SCNC: 4.5 MMOL/L (ref 3.5–5.1)
RBC # BLD: 2.75 M/CU MM (ref 4.2–5.4)
SODIUM BLD-SCNC: 141 MMOL/L (ref 135–145)
TOTAL PROTEIN: 5.3 GM/DL (ref 6.4–8.2)
WBC # BLD: 7.4 K/CU MM (ref 4–10.5)

## 2022-05-03 PROCEDURE — 85027 COMPLETE CBC AUTOMATED: CPT

## 2022-05-03 PROCEDURE — 80053 COMPREHEN METABOLIC PANEL: CPT

## 2022-05-03 PROCEDURE — 36415 COLL VENOUS BLD VENIPUNCTURE: CPT

## 2022-05-05 ENCOUNTER — HOSPITAL ENCOUNTER (OUTPATIENT)
Age: 77
Setting detail: SPECIMEN
Discharge: HOME | End: 2022-05-05

## 2022-05-05 LAB
HCT VFR BLD CALC: 26.1 % (ref 37–47)
HEMOGLOBIN: 7.5 GM/DL (ref 12.5–16)
MCH RBC QN AUTO: 27.7 PG (ref 27–31)
MCHC RBC AUTO-ENTMCNC: 28.7 % (ref 32–36)
MCV RBC AUTO: 96.3 FL (ref 78–100)
PDW BLD-RTO: 19.1 % (ref 11.7–14.9)
PLATELET # BLD: 320 K/CU MM (ref 140–440)
PMV BLD AUTO: 11.2 FL (ref 7.5–11.1)
RBC # BLD: 2.71 M/CU MM (ref 4.2–5.4)
WBC # BLD: 7.4 K/CU MM (ref 4–10.5)

## 2022-05-05 PROCEDURE — 36415 COLL VENOUS BLD VENIPUNCTURE: CPT

## 2022-05-05 PROCEDURE — 85027 COMPLETE CBC AUTOMATED: CPT

## 2022-12-23 ENCOUNTER — HOSPITAL ENCOUNTER (OUTPATIENT)
Dept: MRI IMAGING | Age: 77
Discharge: HOME | End: 2022-12-23
Payer: MEDICARE

## 2022-12-23 DIAGNOSIS — R47.01 APHASIA: ICD-10-CM

## 2022-12-23 PROCEDURE — 70551 MRI BRAIN STEM W/O DYE: CPT

## 2025-06-21 ENCOUNTER — APPOINTMENT (OUTPATIENT)
Dept: CT IMAGING | Age: 80
DRG: 640 | End: 2025-06-21
Payer: MEDICARE

## 2025-06-21 ENCOUNTER — HOSPITAL ENCOUNTER (INPATIENT)
Age: 80
LOS: 5 days | Discharge: NURSING, REHAB OR LONG-TERM CARE | DRG: 640 | End: 2025-06-26
Attending: STUDENT IN AN ORGANIZED HEALTH CARE EDUCATION/TRAINING PROGRAM | Admitting: INTERNAL MEDICINE
Payer: MEDICARE

## 2025-06-21 ENCOUNTER — APPOINTMENT (OUTPATIENT)
Dept: GENERAL RADIOLOGY | Age: 80
DRG: 640 | End: 2025-06-21
Payer: MEDICARE

## 2025-06-21 DIAGNOSIS — E87.1 HYPONATREMIA: Primary | ICD-10-CM

## 2025-06-21 DIAGNOSIS — E83.42 HYPOMAGNESEMIA: ICD-10-CM

## 2025-06-21 DIAGNOSIS — R55 SYNCOPE AND COLLAPSE: ICD-10-CM

## 2025-06-21 DIAGNOSIS — R00.1 BRADYCARDIA: ICD-10-CM

## 2025-06-21 DIAGNOSIS — I63.9 STROKE, ACUTE, THROMBOTIC (HCC): ICD-10-CM

## 2025-06-21 DIAGNOSIS — N17.9 AKI (ACUTE KIDNEY INJURY): ICD-10-CM

## 2025-06-21 LAB
ALBUMIN SERPL-MCNC: 3.7 G/DL (ref 3.4–5)
ALBUMIN/GLOB SERPL: 1.7 {RATIO} (ref 1.1–2.2)
ALP SERPL-CCNC: 94 U/L (ref 40–129)
ALT SERPL-CCNC: 27 U/L (ref 10–40)
ANION GAP SERPL CALCULATED.3IONS-SCNC: 11 MMOL/L (ref 9–17)
ANION GAP SERPL CALCULATED.3IONS-SCNC: 15 MMOL/L (ref 9–17)
AST SERPL-CCNC: 28 U/L (ref 15–37)
BACTERIA URNS QL MICRO: ABNORMAL
BASOPHILS # BLD: 0.03 K/UL
BASOPHILS NFR BLD: 0 % (ref 0–1)
BILIRUB SERPL-MCNC: 0.4 MG/DL (ref 0–1)
BILIRUB UR QL STRIP: NEGATIVE
BNP SERPL-MCNC: 885 PG/ML (ref 0–450)
BUN SERPL-MCNC: 16 MG/DL (ref 7–20)
BUN SERPL-MCNC: 20 MG/DL (ref 7–20)
CALCIUM SERPL-MCNC: 8.6 MG/DL (ref 8.3–10.6)
CALCIUM SERPL-MCNC: 9.3 MG/DL (ref 8.3–10.6)
CASTS #/AREA URNS LPF: ABNORMAL /LPF
CHARACTER UR: ABNORMAL
CHLORIDE SERPL-SCNC: 78 MMOL/L (ref 99–110)
CHLORIDE SERPL-SCNC: 85 MMOL/L (ref 99–110)
CLARITY UR: CLEAR
CO2 SERPL-SCNC: 19 MMOL/L (ref 21–32)
CO2 SERPL-SCNC: 21 MMOL/L (ref 21–32)
COLOR UR: YELLOW
CREAT SERPL-MCNC: 1.1 MG/DL (ref 0.6–1.2)
CREAT SERPL-MCNC: 1.3 MG/DL (ref 0.6–1.2)
CREAT UR-MCNC: 88.9 MG/DL (ref 28–217)
EOSINOPHIL # BLD: 0.06 K/UL
EOSINOPHILS RELATIVE PERCENT: 1 % (ref 0–3)
EPI CELLS #/AREA URNS HPF: 1 /HPF
ERYTHROCYTE [DISTWIDTH] IN BLOOD BY AUTOMATED COUNT: 13.7 % (ref 11.7–14.9)
GFR, ESTIMATED: 38 ML/MIN/1.73M2
GFR, ESTIMATED: 48 ML/MIN/1.73M2
GLUCOSE BLD-MCNC: 60 MG/DL (ref 74–99)
GLUCOSE BLD-MCNC: 62 MG/DL (ref 74–99)
GLUCOSE SERPL-MCNC: 114 MG/DL (ref 74–99)
GLUCOSE SERPL-MCNC: 52 MG/DL (ref 74–99)
GLUCOSE UR STRIP-MCNC: NEGATIVE MG/DL
HCT VFR BLD AUTO: 30.8 % (ref 37–47)
HGB BLD-MCNC: 11.3 G/DL (ref 12.5–16)
HGB UR QL STRIP.AUTO: NEGATIVE
IMM GRANULOCYTES # BLD AUTO: 0.03 K/UL
IMM GRANULOCYTES NFR BLD: 0 %
KETONES UR STRIP-MCNC: NEGATIVE MG/DL
LEUKOCYTE ESTERASE UR QL STRIP: NEGATIVE
LYMPHOCYTES NFR BLD: 1.06 K/UL
LYMPHOCYTES RELATIVE PERCENT: 13 % (ref 24–44)
MAGNESIUM SERPL-MCNC: 1.4 MG/DL (ref 1.8–2.4)
MAGNESIUM SERPL-MCNC: 1.6 MG/DL (ref 1.8–2.4)
MCH RBC QN AUTO: 31.1 PG (ref 27–31)
MCHC RBC AUTO-ENTMCNC: 36.7 G/DL (ref 32–36)
MCV RBC AUTO: 84.8 FL (ref 78–100)
MONOCYTES NFR BLD: 0.55 K/UL
MONOCYTES NFR BLD: 7 % (ref 0–5)
MUCOUS THREADS URNS QL MICRO: ABNORMAL
NEUTROPHILS NFR BLD: 79 % (ref 36–66)
NEUTS SEG NFR BLD: 6.55 K/UL
NITRITE UR QL STRIP: NEGATIVE
OSMOLALITY UR: 520 MOSM/KG (ref 292–1090)
PH UR STRIP: 6 [PH] (ref 5–8)
PHOSPHATE SERPL-MCNC: 3 MG/DL (ref 2.5–4.9)
PLATELET # BLD AUTO: 297 K/UL (ref 140–440)
PMV BLD AUTO: 11 FL (ref 7.5–11.1)
POTASSIUM SERPL-SCNC: 4.1 MMOL/L (ref 3.5–5.1)
POTASSIUM SERPL-SCNC: 4.6 MMOL/L (ref 3.5–5.1)
PROT SERPL-MCNC: 5.8 G/DL (ref 6.4–8.2)
PROT UR STRIP-MCNC: ABNORMAL MG/DL
RBC # BLD AUTO: 3.63 M/UL (ref 4.2–5.4)
RBC #/AREA URNS HPF: 2 /HPF (ref 0–2)
SODIUM SERPL-SCNC: 113 MMOL/L (ref 136–145)
SODIUM SERPL-SCNC: 117 MMOL/L (ref 136–145)
SODIUM UR-SCNC: 94 MMOL/L (ref 40–220)
SP GR UR STRIP: 1.02 (ref 1–1.03)
TROPONIN I SERPL HS-MCNC: 15 NG/L (ref 0–14)
TROPONIN I SERPL HS-MCNC: 17 NG/L (ref 0–14)
TSH SERPL DL<=0.05 MIU/L-ACNC: 1.23 UIU/ML (ref 0.27–4.2)
UROBILINOGEN UR STRIP-ACNC: 0.2 EU/DL (ref 0–1)
WBC #/AREA URNS HPF: 13 /HPF (ref 0–5)
WBC OTHER # BLD: 8.3 K/UL (ref 4–10.5)

## 2025-06-21 PROCEDURE — 82962 GLUCOSE BLOOD TEST: CPT

## 2025-06-21 PROCEDURE — 2580000003 HC RX 258: Performed by: INTERNAL MEDICINE

## 2025-06-21 PROCEDURE — 84100 ASSAY OF PHOSPHORUS: CPT

## 2025-06-21 PROCEDURE — 87077 CULTURE AEROBIC IDENTIFY: CPT

## 2025-06-21 PROCEDURE — 93005 ELECTROCARDIOGRAM TRACING: CPT | Performed by: STUDENT IN AN ORGANIZED HEALTH CARE EDUCATION/TRAINING PROGRAM

## 2025-06-21 PROCEDURE — 83935 ASSAY OF URINE OSMOLALITY: CPT

## 2025-06-21 PROCEDURE — 2500000003 HC RX 250 WO HCPCS: Performed by: INTERNAL MEDICINE

## 2025-06-21 PROCEDURE — 83735 ASSAY OF MAGNESIUM: CPT

## 2025-06-21 PROCEDURE — 80048 BASIC METABOLIC PNL TOTAL CA: CPT

## 2025-06-21 PROCEDURE — 84443 ASSAY THYROID STIM HORMONE: CPT

## 2025-06-21 PROCEDURE — 2000000000 HC ICU R&B

## 2025-06-21 PROCEDURE — 6370000000 HC RX 637 (ALT 250 FOR IP): Performed by: INTERNAL MEDICINE

## 2025-06-21 PROCEDURE — 6360000002 HC RX W HCPCS: Performed by: INTERNAL MEDICINE

## 2025-06-21 PROCEDURE — 70450 CT HEAD/BRAIN W/O DYE: CPT

## 2025-06-21 PROCEDURE — 6360000002 HC RX W HCPCS: Performed by: STUDENT IN AN ORGANIZED HEALTH CARE EDUCATION/TRAINING PROGRAM

## 2025-06-21 PROCEDURE — 71045 X-RAY EXAM CHEST 1 VIEW: CPT

## 2025-06-21 PROCEDURE — 84484 ASSAY OF TROPONIN QUANT: CPT

## 2025-06-21 PROCEDURE — 72125 CT NECK SPINE W/O DYE: CPT

## 2025-06-21 PROCEDURE — 87186 SC STD MICRODIL/AGAR DIL: CPT

## 2025-06-21 PROCEDURE — 84300 ASSAY OF URINE SODIUM: CPT

## 2025-06-21 PROCEDURE — 36415 COLL VENOUS BLD VENIPUNCTURE: CPT

## 2025-06-21 PROCEDURE — 2580000003 HC RX 258: Performed by: STUDENT IN AN ORGANIZED HEALTH CARE EDUCATION/TRAINING PROGRAM

## 2025-06-21 PROCEDURE — 51702 INSERT TEMP BLADDER CATH: CPT

## 2025-06-21 PROCEDURE — 82570 ASSAY OF URINE CREATININE: CPT

## 2025-06-21 PROCEDURE — 83880 ASSAY OF NATRIURETIC PEPTIDE: CPT

## 2025-06-21 PROCEDURE — 96365 THER/PROPH/DIAG IV INF INIT: CPT

## 2025-06-21 PROCEDURE — 87086 URINE CULTURE/COLONY COUNT: CPT

## 2025-06-21 PROCEDURE — 85025 COMPLETE CBC W/AUTO DIFF WBC: CPT

## 2025-06-21 PROCEDURE — 99285 EMERGENCY DEPT VISIT HI MDM: CPT

## 2025-06-21 PROCEDURE — 81001 URINALYSIS AUTO W/SCOPE: CPT

## 2025-06-21 PROCEDURE — 80053 COMPREHEN METABOLIC PANEL: CPT

## 2025-06-21 RX ORDER — DESMOPRESSIN ACETATE 0.2 MG/1
0.2 TABLET ORAL DAILY
Status: ON HOLD | COMMUNITY
End: 2025-06-26 | Stop reason: HOSPADM

## 2025-06-21 RX ORDER — BUSPIRONE HYDROCHLORIDE 10 MG/1
10 TABLET ORAL 3 TIMES DAILY
COMMUNITY

## 2025-06-21 RX ORDER — FERROUS SULFATE 325(65) MG
325 TABLET ORAL
COMMUNITY

## 2025-06-21 RX ORDER — POTASSIUM CHLORIDE 7.45 MG/ML
10 INJECTION INTRAVENOUS PRN
Status: DISCONTINUED | OUTPATIENT
Start: 2025-06-21 | End: 2025-06-26 | Stop reason: HOSPADM

## 2025-06-21 RX ORDER — SODIUM CHLORIDE 9 MG/ML
INJECTION, SOLUTION INTRAVENOUS CONTINUOUS
Status: DISCONTINUED | OUTPATIENT
Start: 2025-06-21 | End: 2025-06-22

## 2025-06-21 RX ORDER — POLYETHYLENE GLYCOL 3350 17 G/17G
17 POWDER, FOR SOLUTION ORAL DAILY PRN
Status: DISCONTINUED | OUTPATIENT
Start: 2025-06-21 | End: 2025-06-26 | Stop reason: HOSPADM

## 2025-06-21 RX ORDER — ASPIRIN 325 MG
325 TABLET ORAL DAILY
Status: DISCONTINUED | OUTPATIENT
Start: 2025-06-22 | End: 2025-06-26 | Stop reason: HOSPADM

## 2025-06-21 RX ORDER — SODIUM CHLORIDE 0.9 % (FLUSH) 0.9 %
5-40 SYRINGE (ML) INJECTION EVERY 12 HOURS SCHEDULED
Status: DISCONTINUED | OUTPATIENT
Start: 2025-06-21 | End: 2025-06-26 | Stop reason: HOSPADM

## 2025-06-21 RX ORDER — ATORVASTATIN CALCIUM 10 MG/1
20 TABLET, FILM COATED ORAL DAILY
Status: DISCONTINUED | OUTPATIENT
Start: 2025-06-22 | End: 2025-06-24

## 2025-06-21 RX ORDER — ACETAMINOPHEN 650 MG/1
650 SUPPOSITORY RECTAL EVERY 6 HOURS PRN
Status: DISCONTINUED | OUTPATIENT
Start: 2025-06-21 | End: 2025-06-26 | Stop reason: HOSPADM

## 2025-06-21 RX ORDER — ALBUTEROL SULFATE 90 UG/1
2 INHALANT RESPIRATORY (INHALATION) EVERY 6 HOURS PRN
Status: DISCONTINUED | OUTPATIENT
Start: 2025-06-21 | End: 2025-06-26 | Stop reason: HOSPADM

## 2025-06-21 RX ORDER — SULFAMETHOXAZOLE AND TRIMETHOPRIM 800; 160 MG/1; MG/1
1 TABLET ORAL 2 TIMES DAILY
Status: ON HOLD | COMMUNITY
Start: 2025-06-09 | End: 2025-06-26 | Stop reason: HOSPADM

## 2025-06-21 RX ORDER — VERAPAMIL HYDROCHLORIDE 240 MG/1
240 TABLET, FILM COATED, EXTENDED RELEASE ORAL NIGHTLY
Status: DISCONTINUED | OUTPATIENT
Start: 2025-06-22 | End: 2025-06-22

## 2025-06-21 RX ORDER — MAGNESIUM SULFATE IN WATER 40 MG/ML
2000 INJECTION, SOLUTION INTRAVENOUS PRN
Status: DISCONTINUED | OUTPATIENT
Start: 2025-06-21 | End: 2025-06-26 | Stop reason: HOSPADM

## 2025-06-21 RX ORDER — SODIUM CHLORIDE 0.9 % (FLUSH) 0.9 %
5-40 SYRINGE (ML) INJECTION PRN
Status: DISCONTINUED | OUTPATIENT
Start: 2025-06-21 | End: 2025-06-26 | Stop reason: HOSPADM

## 2025-06-21 RX ORDER — LOSARTAN POTASSIUM 25 MG/1
50 TABLET ORAL DAILY
Status: DISCONTINUED | OUTPATIENT
Start: 2025-06-21 | End: 2025-06-22

## 2025-06-21 RX ORDER — MAGNESIUM SULFATE IN WATER 40 MG/ML
2000 INJECTION, SOLUTION INTRAVENOUS ONCE
Status: COMPLETED | OUTPATIENT
Start: 2025-06-21 | End: 2025-06-22

## 2025-06-21 RX ORDER — SODIUM CHLORIDE 9 MG/ML
INJECTION, SOLUTION INTRAVENOUS PRN
Status: DISCONTINUED | OUTPATIENT
Start: 2025-06-21 | End: 2025-06-26 | Stop reason: HOSPADM

## 2025-06-21 RX ORDER — ACETAMINOPHEN 325 MG/1
650 TABLET ORAL EVERY 6 HOURS PRN
Status: DISCONTINUED | OUTPATIENT
Start: 2025-06-21 | End: 2025-06-26 | Stop reason: HOSPADM

## 2025-06-21 RX ORDER — BUSPIRONE HYDROCHLORIDE 5 MG/1
10 TABLET ORAL 3 TIMES DAILY
Status: DISCONTINUED | OUTPATIENT
Start: 2025-06-21 | End: 2025-06-26 | Stop reason: HOSPADM

## 2025-06-21 RX ORDER — POTASSIUM CHLORIDE 29.8 MG/ML
20 INJECTION INTRAVENOUS PRN
Status: DISCONTINUED | OUTPATIENT
Start: 2025-06-21 | End: 2025-06-26 | Stop reason: HOSPADM

## 2025-06-21 RX ORDER — CLOPIDOGREL BISULFATE 75 MG/1
75 TABLET ORAL DAILY
Status: DISCONTINUED | OUTPATIENT
Start: 2025-06-22 | End: 2025-06-24

## 2025-06-21 RX ORDER — PANTOPRAZOLE SODIUM 40 MG/1
40 TABLET, DELAYED RELEASE ORAL
Status: DISCONTINUED | OUTPATIENT
Start: 2025-06-22 | End: 2025-06-26 | Stop reason: HOSPADM

## 2025-06-21 RX ORDER — 0.9 % SODIUM CHLORIDE 0.9 %
1000 INTRAVENOUS SOLUTION INTRAVENOUS ONCE
Status: COMPLETED | OUTPATIENT
Start: 2025-06-21 | End: 2025-06-21

## 2025-06-21 RX ORDER — ENOXAPARIN SODIUM 100 MG/ML
40 INJECTION SUBCUTANEOUS DAILY
Status: DISCONTINUED | OUTPATIENT
Start: 2025-06-21 | End: 2025-06-23

## 2025-06-21 RX ORDER — MAGNESIUM SULFATE IN WATER 40 MG/ML
2000 INJECTION, SOLUTION INTRAVENOUS ONCE
Status: COMPLETED | OUTPATIENT
Start: 2025-06-21 | End: 2025-06-21

## 2025-06-21 RX ADMIN — WATER 1000 MG: 1 INJECTION INTRAMUSCULAR; INTRAVENOUS; SUBCUTANEOUS at 22:31

## 2025-06-21 RX ADMIN — MAGNESIUM SULFATE HEPTAHYDRATE 2000 MG: 40 INJECTION, SOLUTION INTRAVENOUS at 18:13

## 2025-06-21 RX ADMIN — MAGNESIUM SULFATE HEPTAHYDRATE 2000 MG: 40 INJECTION, SOLUTION INTRAVENOUS at 23:42

## 2025-06-21 RX ADMIN — SODIUM CHLORIDE, PRESERVATIVE FREE 10 ML: 5 INJECTION INTRAVENOUS at 22:38

## 2025-06-21 RX ADMIN — BUSPIRONE HYDROCHLORIDE 10 MG: 5 TABLET ORAL at 22:37

## 2025-06-21 RX ADMIN — NICARDIPINE HYDROCHLORIDE 5 MG/HR: 25 INJECTION INTRAVENOUS at 22:31

## 2025-06-21 RX ADMIN — SODIUM CHLORIDE 1000 ML: 9 INJECTION, SOLUTION INTRAVENOUS at 16:57

## 2025-06-21 RX ADMIN — ENOXAPARIN SODIUM 40 MG: 100 INJECTION SUBCUTANEOUS at 22:37

## 2025-06-21 RX ADMIN — SODIUM CHLORIDE: 9 INJECTION, SOLUTION INTRAVENOUS at 18:51

## 2025-06-21 ASSESSMENT — PAIN - FUNCTIONAL ASSESSMENT: PAIN_FUNCTIONAL_ASSESSMENT: NONE - DENIES PAIN

## 2025-06-21 NOTE — ED NOTES
ED TO INPATIENT SBAR HANDOFF    Patient Name: Natalia Ortiz   :  1945  79 y.o.   Preferred Name    Family/Caregiver Present yes   Restraints no   C-SSRS:    Sitter no   Sepsis Risk Score Sepsis V2 Risk Score: 16.9    PLEASE NOTE--Encounter / Re-Admission Within 30 Days  This patient has had another encounter or admission within the last 30 days.      No data recorded    Situation  Chief Complaint   Patient presents with    Loss of Consciousness     Patient was found in the bathroom laying on her back on the floor.  Neighbor arrived and woke patient up when she found her.  Neighbor reported to EMS that she spoke to patient on the phone at 13:30 today.     Brief Description of Patient's Condition: Pt arrived to ED via EMS for a fall. Pt found on the floor in the bathroom by family. Pt family explained she is confused and has a known UTI. Pt a/ox2. Pt being admitted for syncope and collapse and bradycardia.   Mental Status: alert, coherent, and able to concentrate and follow conversation  Arrived from: home    Imaging:   XR CHEST PORTABLE   Final Result      CT HEAD WO CONTRAST    (Results Pending)   CT CERVICAL SPINE WO CONTRAST    (Results Pending)     Abnormal labs:   Abnormal Labs Reviewed   CBC WITH AUTO DIFFERENTIAL - Abnormal; Notable for the following components:       Result Value    RBC 3.63 (*)     Hemoglobin 11.3 (*)     Hematocrit 30.8 (*)     MCH 31.1 (*)     MCHC 36.7 (*)     Neutrophils % 79 (*)     Lymphocytes % 13 (*)     Monocytes % 7 (*)     All other components within normal limits   TROPONIN - Abnormal; Notable for the following components:    Troponin, High Sensitivity 17 (*)     All other components within normal limits        Background  History:   Past Medical History:   Diagnosis Date    Basal cell carcinoma of skin 2013    right foot-Dr Esteban    Diabetes mellitus (HCC)     Diverticulosis     GERD (gastroesophageal reflux disease)     Hiatal hernia     History of nuclear stress

## 2025-06-21 NOTE — ED PROVIDER NOTES
(HYZAAR) 100-25 MG per tablet, Take 1 tablet by mouth daily., Disp: , Rfl:     albuterol (PROVENTIL HFA;VENTOLIN HFA) 108 (90 BASE) MCG/ACT inhaler, Inhale 2 puffs into the lungs every 6 hours as needed for Wheezing., Disp: , Rfl:     insulin glargine (LANTUS SOLOSTAR) 100 UNIT/ML injection, INJECT 48 UNITS UNDER THE SKIN NIGHTLY (Patient taking differently: INJECT 40 UNITS UNDER THE SKIN NIGHTLY), Disp: 15 Pen, Rfl: 3    Insulin Pen Needle (UNIFINE PENTIPS) 31G X 8 MM MISC, Injects once daily, Disp: 100 each, Rfl: 3    atorvastatin (LIPITOR) 20 MG tablet, Take 1 tablet by mouth daily., Disp: 90 tablet, Rfl: 3  Previous Medications    ALBUTEROL (PROVENTIL HFA;VENTOLIN HFA) 108 (90 BASE) MCG/ACT INHALER    Inhale 2 puffs into the lungs every 6 hours as needed for Wheezing.    ASPIRIN 325 MG TABLET    Take 325 mg by mouth daily    ATORVASTATIN (LIPITOR) 20 MG TABLET    Take 1 tablet by mouth daily.    BETAMETHASONE DIPROPIONATE (DIPROLENE) 0.05 % CREAM    APPLY TO AREAS OF RASH ON BACKSIDE AND ABDOMEN TWICE A DAY AS NEEDED    BUSPIRONE (BUSPAR) 10 MG TABLET    Take 1 tablet by mouth 3 times daily    CHOLECALCIFEROL (VITAMIN D3) 25 MCG (1000 UT) TABS    Take 1,000 Units by mouth daily    CITALOPRAM (CELEXA) 20 MG TABLET    TAKE 1 TABLET BY MOUTH EVERY DAY    DESMOPRESSIN (DDAVP) 0.2 MG TABLET    Take 1 tablet by mouth daily    FERROUS SULFATE (IRON 325) 325 (65 FE) MG TABLET    Take 1 tablet by mouth daily (before dinner)    FUROSEMIDE (LASIX) 20 MG TABLET        GABAPENTIN (NEURONTIN) 300 MG CAPSULE        GLIMEPIRIDE (AMARYL) 2 MG TABLET    TAKE 2 TABLETS BY MOUTH EVERY MORNING    HYDROCHLOROTHIAZIDE (HYDRODIURIL) 25 MG TABLET        IBUPROFEN (ADVIL;MOTRIN) 600 MG TABLET    Take 1 tablet by mouth every 6 hours as needed for Pain    INSULIN GLARGINE (LANTUS SOLOSTAR) 100 UNIT/ML INJECTION    INJECT 48 UNITS UNDER THE SKIN NIGHTLY    INSULIN PEN NEEDLE (UNIFINE PENTIPS) 31G X 8 MM MISC    Injects once daily

## 2025-06-22 LAB
ANION GAP SERPL CALCULATED.3IONS-SCNC: 12 MMOL/L (ref 9–17)
BASOPHILS # BLD: 0.02 K/UL
BASOPHILS NFR BLD: 0 % (ref 0–1)
BUN SERPL-MCNC: 12 MG/DL (ref 7–20)
BUN SERPL-MCNC: 13 MG/DL (ref 7–20)
BUN SERPL-MCNC: 15 MG/DL (ref 7–20)
CALCIUM SERPL-MCNC: 8.3 MG/DL (ref 8.3–10.6)
CALCIUM SERPL-MCNC: 8.3 MG/DL (ref 8.3–10.6)
CALCIUM SERPL-MCNC: 8.4 MG/DL (ref 8.3–10.6)
CALCIUM SERPL-MCNC: 8.7 MG/DL (ref 8.3–10.6)
CALCIUM SERPL-MCNC: 8.8 MG/DL (ref 8.3–10.6)
CHLORIDE SERPL-SCNC: 84 MMOL/L (ref 99–110)
CHLORIDE SERPL-SCNC: 86 MMOL/L (ref 99–110)
CHLORIDE SERPL-SCNC: 88 MMOL/L (ref 99–110)
CO2 SERPL-SCNC: 20 MMOL/L (ref 21–32)
CO2 SERPL-SCNC: 22 MMOL/L (ref 21–32)
CREAT SERPL-MCNC: 1 MG/DL (ref 0.6–1.2)
CREAT SERPL-MCNC: 1.2 MG/DL (ref 0.6–1.2)
CREAT SERPL-MCNC: 1.2 MG/DL (ref 0.6–1.2)
EOSINOPHIL # BLD: 0.06 K/UL
EOSINOPHILS RELATIVE PERCENT: 1 % (ref 0–3)
ERYTHROCYTE [DISTWIDTH] IN BLOOD BY AUTOMATED COUNT: 13.6 % (ref 11.7–14.9)
GFR, ESTIMATED: 42 ML/MIN/1.73M2
GFR, ESTIMATED: 43 ML/MIN/1.73M2
GFR, ESTIMATED: 51 ML/MIN/1.73M2
GFR, ESTIMATED: 52 ML/MIN/1.73M2
GFR, ESTIMATED: 56 ML/MIN/1.73M2
GLUCOSE BLD-MCNC: 101 MG/DL (ref 74–99)
GLUCOSE BLD-MCNC: 111 MG/DL (ref 74–99)
GLUCOSE BLD-MCNC: 130 MG/DL (ref 74–99)
GLUCOSE BLD-MCNC: 138 MG/DL (ref 74–99)
GLUCOSE BLD-MCNC: 166 MG/DL (ref 74–99)
GLUCOSE BLD-MCNC: 84 MG/DL (ref 74–99)
GLUCOSE BLD-MCNC: 87 MG/DL (ref 74–99)
GLUCOSE SERPL-MCNC: 147 MG/DL (ref 74–99)
GLUCOSE SERPL-MCNC: 157 MG/DL (ref 74–99)
GLUCOSE SERPL-MCNC: 176 MG/DL (ref 74–99)
GLUCOSE SERPL-MCNC: 200 MG/DL (ref 74–99)
GLUCOSE SERPL-MCNC: 73 MG/DL (ref 74–99)
HCT VFR BLD AUTO: 28.7 % (ref 37–47)
HGB BLD-MCNC: 10.3 G/DL (ref 12.5–16)
IMM GRANULOCYTES # BLD AUTO: 0.02 K/UL
IMM GRANULOCYTES NFR BLD: 0 %
LYMPHOCYTES NFR BLD: 1.48 K/UL
LYMPHOCYTES RELATIVE PERCENT: 23 % (ref 24–44)
MAGNESIUM SERPL-MCNC: 2 MG/DL (ref 1.8–2.4)
MCH RBC QN AUTO: 30.4 PG (ref 27–31)
MCHC RBC AUTO-ENTMCNC: 35.9 G/DL (ref 32–36)
MCV RBC AUTO: 84.7 FL (ref 78–100)
MONOCYTES NFR BLD: 0.59 K/UL
MONOCYTES NFR BLD: 9 % (ref 0–5)
NEUTROPHILS NFR BLD: 67 % (ref 36–66)
NEUTS SEG NFR BLD: 4.36 K/UL
PHOSPHATE SERPL-MCNC: 3.1 MG/DL (ref 2.5–4.9)
PLATELET # BLD AUTO: 219 K/UL (ref 140–440)
PMV BLD AUTO: 11.1 FL (ref 7.5–11.1)
POTASSIUM SERPL-SCNC: 3.7 MMOL/L (ref 3.5–5.1)
POTASSIUM SERPL-SCNC: 3.8 MMOL/L (ref 3.5–5.1)
POTASSIUM SERPL-SCNC: 4.2 MMOL/L (ref 3.5–5.1)
POTASSIUM SERPL-SCNC: 4.2 MMOL/L (ref 3.5–5.1)
POTASSIUM SERPL-SCNC: 4.3 MMOL/L (ref 3.5–5.1)
RBC # BLD AUTO: 3.39 M/UL (ref 4.2–5.4)
SODIUM SERPL-SCNC: 116 MMOL/L (ref 136–145)
SODIUM SERPL-SCNC: 118 MMOL/L (ref 136–145)
SODIUM SERPL-SCNC: 119 MMOL/L (ref 136–145)
SODIUM SERPL-SCNC: 119 MMOL/L (ref 136–145)
SODIUM SERPL-SCNC: 120 MMOL/L (ref 136–145)
WBC OTHER # BLD: 6.5 K/UL (ref 4–10.5)

## 2025-06-22 PROCEDURE — 83735 ASSAY OF MAGNESIUM: CPT

## 2025-06-22 PROCEDURE — 6370000000 HC RX 637 (ALT 250 FOR IP): Performed by: INTERNAL MEDICINE

## 2025-06-22 PROCEDURE — 93005 ELECTROCARDIOGRAM TRACING: CPT | Performed by: SPECIALIST

## 2025-06-22 PROCEDURE — 6360000002 HC RX W HCPCS

## 2025-06-22 PROCEDURE — 85025 COMPLETE CBC W/AUTO DIFF WBC: CPT

## 2025-06-22 PROCEDURE — 82962 GLUCOSE BLOOD TEST: CPT

## 2025-06-22 PROCEDURE — 6360000002 HC RX W HCPCS: Performed by: INTERNAL MEDICINE

## 2025-06-22 PROCEDURE — 94761 N-INVAS EAR/PLS OXIMETRY MLT: CPT

## 2025-06-22 PROCEDURE — 2580000003 HC RX 258

## 2025-06-22 PROCEDURE — 36415 COLL VENOUS BLD VENIPUNCTURE: CPT

## 2025-06-22 PROCEDURE — 84100 ASSAY OF PHOSPHORUS: CPT

## 2025-06-22 PROCEDURE — 2000000000 HC ICU R&B

## 2025-06-22 PROCEDURE — 80048 BASIC METABOLIC PNL TOTAL CA: CPT

## 2025-06-22 PROCEDURE — 2500000003 HC RX 250 WO HCPCS: Performed by: INTERNAL MEDICINE

## 2025-06-22 RX ORDER — LOSARTAN POTASSIUM 100 MG/1
100 TABLET ORAL DAILY
Status: DISCONTINUED | OUTPATIENT
Start: 2025-06-22 | End: 2025-06-26 | Stop reason: HOSPADM

## 2025-06-22 RX ORDER — VERAPAMIL HYDROCHLORIDE 240 MG/1
240 TABLET, FILM COATED, EXTENDED RELEASE ORAL DAILY
Status: DISCONTINUED | OUTPATIENT
Start: 2025-06-22 | End: 2025-06-26 | Stop reason: HOSPADM

## 2025-06-22 RX ORDER — HYDRALAZINE HYDROCHLORIDE 20 MG/ML
INJECTION INTRAMUSCULAR; INTRAVENOUS
Status: COMPLETED
Start: 2025-06-22 | End: 2025-06-22

## 2025-06-22 RX ORDER — SODIUM CHLORIDE 9 MG/ML
INJECTION, SOLUTION INTRAVENOUS CONTINUOUS
Status: DISCONTINUED | OUTPATIENT
Start: 2025-06-22 | End: 2025-06-23

## 2025-06-22 RX ORDER — HYDRALAZINE HYDROCHLORIDE 20 MG/ML
10 INJECTION INTRAMUSCULAR; INTRAVENOUS ONCE
Status: COMPLETED | OUTPATIENT
Start: 2025-06-22 | End: 2025-06-22

## 2025-06-22 RX ORDER — ONDANSETRON 2 MG/ML
4 INJECTION INTRAMUSCULAR; INTRAVENOUS EVERY 6 HOURS PRN
Status: DISCONTINUED | OUTPATIENT
Start: 2025-06-22 | End: 2025-06-26 | Stop reason: HOSPADM

## 2025-06-22 RX ORDER — HYDRALAZINE HYDROCHLORIDE 25 MG/1
25 TABLET, FILM COATED ORAL EVERY 8 HOURS SCHEDULED
Status: DISCONTINUED | OUTPATIENT
Start: 2025-06-22 | End: 2025-06-23

## 2025-06-22 RX ADMIN — VERAPAMIL HYDROCHLORIDE 240 MG: 240 TABLET, FILM COATED, EXTENDED RELEASE ORAL at 09:57

## 2025-06-22 RX ADMIN — BUSPIRONE HYDROCHLORIDE 10 MG: 5 TABLET ORAL at 09:56

## 2025-06-22 RX ADMIN — HYDRALAZINE HYDROCHLORIDE 10 MG: 20 INJECTION INTRAMUSCULAR; INTRAVENOUS at 01:20

## 2025-06-22 RX ADMIN — ENOXAPARIN SODIUM 40 MG: 100 INJECTION SUBCUTANEOUS at 09:57

## 2025-06-22 RX ADMIN — HYDRALAZINE HYDROCHLORIDE 25 MG: 25 TABLET ORAL at 23:17

## 2025-06-22 RX ADMIN — LOSARTAN POTASSIUM 100 MG: 100 TABLET, FILM COATED ORAL at 09:59

## 2025-06-22 RX ADMIN — ATORVASTATIN CALCIUM 20 MG: 10 TABLET, FILM COATED ORAL at 09:56

## 2025-06-22 RX ADMIN — SODIUM CHLORIDE: 0.9 INJECTION, SOLUTION INTRAVENOUS at 15:06

## 2025-06-22 RX ADMIN — LOSARTAN POTASSIUM 50 MG: 25 TABLET, FILM COATED ORAL at 00:09

## 2025-06-22 RX ADMIN — SODIUM CHLORIDE, PRESERVATIVE FREE 10 ML: 5 INJECTION INTRAVENOUS at 10:17

## 2025-06-22 RX ADMIN — PANTOPRAZOLE SODIUM 40 MG: 40 TABLET, DELAYED RELEASE ORAL at 10:16

## 2025-06-22 RX ADMIN — WATER 1000 MG: 1 INJECTION INTRAMUSCULAR; INTRAVENOUS; SUBCUTANEOUS at 22:29

## 2025-06-22 RX ADMIN — BUSPIRONE HYDROCHLORIDE 10 MG: 5 TABLET ORAL at 20:06

## 2025-06-22 RX ADMIN — HYDRALAZINE HYDROCHLORIDE 10 MG: 20 INJECTION INTRAMUSCULAR; INTRAVENOUS at 20:06

## 2025-06-22 RX ADMIN — ASPIRIN 325 MG: 325 TABLET ORAL at 09:56

## 2025-06-22 RX ADMIN — BUSPIRONE HYDROCHLORIDE 10 MG: 5 TABLET ORAL at 14:41

## 2025-06-22 RX ADMIN — CLOPIDOGREL BISULFATE 75 MG: 75 TABLET, FILM COATED ORAL at 09:57

## 2025-06-22 NOTE — CONSULTS
V2.0  [facility] [place]      Name:  Natalia Ortiz /Age/Sex: 1945  (79 y.o. female)   MRN & CSN:  [id number] & [id number] Encounter Date/Time: 2025 12:39 PM EDT   Location:  -A PCP: Pedro Morris MD     Attending:Vel Chacon Jr., *  Consulting Provider: Jesus Manuel Vela MD      Hospital Day: 2    Assessment and Recommendations   Natalia Ortiz is a 79 y.o. female  who presents with Hyponatremia      # Severe hyponatremia  - Likely due to medication  - On desmopressin for nocturnal enuresis, Lasix and thiazide  -Nephrology consulted and managing  - Gentle correction and monitor for CPM  - Continue supportive care    # Essential hypertension  - On losartan and verapamil  - Thiazides discontinued    # Type 2 diabetes mellitus  - SSI/Lantus  - POC glucose checks  - Hypoglycemic protocol    # Hyperlipidemia  - Continue statins    # History of CVA  # History of PAD  - Continue statins and antiplatelets          Hospital Problems           Last Modified POA    * (Principal) Hyponatremia 2025 Yes           Diet ADULT DIET; Regular; 1500 ml   DVT Prophylaxis [] Lovenox, []  Heparin, [] SCDs, [] Ambulation,  [] Eliquis, [] Xarelto   Code Status Full Code   Surrogate Decision Maker/ POA       Personally reviewed Lab Studies and Imaging           History From:    History obtained from the patient.     History of Present Illness:      Chief Complaint:       Patient is a 79-year-old female with past medical history of diabetes, hypertension, CVA, PAD and nocturnal enuresis managed with desmopressin presenting to the hospital due to fall.  Patient reportedly was found on the floor after falling.  Family reports associated nausea and vomiting, headaches and similar syncopal episode and night before.  On admission trauma series with CT head and C-spine were negative for any acute issues.  Sodium was found to be 113 and patient was admitted to the ICU.  At bedside today denies any headaches,

## 2025-06-22 NOTE — CONSULTS
Patient:   Natalia Ortiz    Date:  25  :  1945, 79 y.o.   Nephrologist: Milagros Benson MD  Provider: Pedro Morris MD    Reason for Consult: Severe hyponatremia likely symptomatic    Consult requested by : Reed Mcgowan MD       Chief Complaint:     Syncopal episode/unresponsiveness        HISTORY OF PRESENT ILLNESS/Brief hospital course  AND  brief background history    Ms. Ortiz, is a 79-year young female who was brought to the emergency department via emergency medical service after a syncopal episode at home.  Apparently she was coming back from the bathroom when she fell, she could not recall any preceding symptoms and she definitely lost consciousness.  My understanding , her daughter-in-law came around to half later and found her in emergency medical help were sought.    On arrival in our emergency department, she was afebrile and is hemodynamically stable, her oxygen saturation 99%, she of course underwent several diagnostic test, which include electrocardiogram, imaging and biochemical.  Electrocardiogram showed sinus bradycardia with heart rate of 51, she had several imaging study including computed tomography of the cervical spine, head without administration of intravenous contrast material as well as chest x-ray.  All of the studies were unrevealing.    Routine biochemical testing showed sodium of 113 with concomitant low chloride of 78 mmol/L, low serum bicarbonate of 19 and elevated creatinine of 1.3 mg/dL other pertinent abnormal lab include slightly low hemoglobin of 11.3, very low magnesium of 1.4 mg/dL, slightly elevated N-terminal pro beta type natruretic peptide of 885 pg/mL plain urinalysis showed trace albuminuria, pH was 6.0 and specific gravity 1.025 but no active sediment.  Urine osmolality was 520 mOsm/kg, and urine sodium was 94 mEq/L.(Urine and urinary indicis were analyzed before administration of any isotonic solution).    I was consulted from

## 2025-06-22 NOTE — H&P
Mentation at baseline. Monitor closely in ICU for seizures/signs of ODS. Correct Na slowly (goal 119-121 by 1600 on 6/22). Continue buspar.     Cardio:  Hemodynamically stable, on hypertensive side. Nicardipine gtt for SBP >160. Re-started reduced dose of losartan. Continue verapamil (patient takes this in AM). Holding HCTZ and lasix in setting of hyponatremia. Continue aspirin/plavix/statin.     Resp: CXR reviewed, no acute process. Stable on room air.     GI: Diet ordered with fluid restriction. Continue PPI.     : Trend Na q4 hours, goal Na 119-121 by 1600 on 6/22. Avoid overcorrection. She is normally on desmopressin for nocturnal enuresis which is currently being held. Dose desmopressin PRN for signs of overcorrection. NS adjusted to 50cc/hr. Urine lytes pending. Suspect Na is a combination of bactrim, hctz/lasix use along with her recent symptoms of N/V/D from bactrim. 1500ml fluid restriction instituted with diet.     Heme:  Lovenox for dvt ppx.     ID: UTI treatment was started on 6/9 and urine here has small amounts of bacteria. Will start rocephin for 3 days given presentation though it would be reasonable to de-escalate this in the next 24 hours if labs and clinical status remain stable.     Endo: Glucose checks ordered, currently hypoglycemic. Will encourage PO intake. Start sliding scale if necessary. Hold home DM meds. TSH stable.     MSK: PT/OT ordered.     Code Status: FULL CODE         Diet ADULT DIET; Regular; 1500 ml   DVT Prophylaxis [x] Lovenox, []  Heparin, [] SCDs, [] Ambulation,  [] Eliquis, [] Xarelto [] Coumadin   GI Prophylaxis [x] Yes         [] No   Disposition Reason for ICU Admit: Hyponatremia  Estimated ICU Stay: 3 days   Code Status Full Code        Critical care attestation:    I personally saw and individually provided 45 cumulative minutes of critical care time out of the total shared critical care time provided. This time was exclusive of procedures performed.     I have

## 2025-06-23 ENCOUNTER — APPOINTMENT (OUTPATIENT)
Dept: CT IMAGING | Age: 80
DRG: 640 | End: 2025-06-23
Payer: MEDICARE

## 2025-06-23 ENCOUNTER — APPOINTMENT (OUTPATIENT)
Dept: GENERAL RADIOLOGY | Age: 80
DRG: 640 | End: 2025-06-23
Payer: MEDICARE

## 2025-06-23 LAB
ANION GAP SERPL CALCULATED.3IONS-SCNC: 12 MMOL/L (ref 9–17)
ANION GAP SERPL CALCULATED.3IONS-SCNC: 12 MMOL/L (ref 9–17)
ANION GAP SERPL CALCULATED.3IONS-SCNC: 14 MMOL/L (ref 9–17)
ANION GAP SERPL CALCULATED.3IONS-SCNC: 14 MMOL/L (ref 9–17)
BASOPHILS # BLD: 0.02 K/UL
BASOPHILS NFR BLD: 0 % (ref 0–1)
BUN SERPL-MCNC: 12 MG/DL (ref 7–20)
BUN SERPL-MCNC: 12 MG/DL (ref 7–20)
BUN SERPL-MCNC: 13 MG/DL (ref 7–20)
BUN SERPL-MCNC: 15 MG/DL (ref 7–20)
CALCIUM SERPL-MCNC: 8.8 MG/DL (ref 8.3–10.6)
CALCIUM SERPL-MCNC: 9 MG/DL (ref 8.3–10.6)
CALCIUM SERPL-MCNC: 9.1 MG/DL (ref 8.3–10.6)
CALCIUM SERPL-MCNC: 9.3 MG/DL (ref 8.3–10.6)
CHLORIDE SERPL-SCNC: 91 MMOL/L (ref 99–110)
CHLORIDE SERPL-SCNC: 92 MMOL/L (ref 99–110)
CHLORIDE SERPL-SCNC: 93 MMOL/L (ref 99–110)
CHLORIDE SERPL-SCNC: 96 MMOL/L (ref 99–110)
CO2 SERPL-SCNC: 19 MMOL/L (ref 21–32)
CO2 SERPL-SCNC: 21 MMOL/L (ref 21–32)
CO2 SERPL-SCNC: 21 MMOL/L (ref 21–32)
CO2 SERPL-SCNC: 23 MMOL/L (ref 21–32)
CREAT SERPL-MCNC: 1.1 MG/DL (ref 0.6–1.2)
CREAT SERPL-MCNC: 1.2 MG/DL (ref 0.6–1.2)
CREAT SERPL-MCNC: 1.5 MG/DL (ref 0.6–1.2)
CREAT SERPL-MCNC: 1.9 MG/DL (ref 0.6–1.2)
EOSINOPHIL # BLD: 0.11 K/UL
EOSINOPHILS RELATIVE PERCENT: 2 % (ref 0–3)
ERYTHROCYTE [DISTWIDTH] IN BLOOD BY AUTOMATED COUNT: 14 % (ref 11.7–14.9)
GFR, ESTIMATED: 26 ML/MIN/1.73M2
GFR, ESTIMATED: 35 ML/MIN/1.73M2
GFR, ESTIMATED: 45 ML/MIN/1.73M2
GFR, ESTIMATED: 47 ML/MIN/1.73M2
GLUCOSE BLD-MCNC: 121 MG/DL (ref 74–99)
GLUCOSE BLD-MCNC: 306 MG/DL (ref 74–99)
GLUCOSE SERPL-MCNC: 116 MG/DL (ref 74–99)
GLUCOSE SERPL-MCNC: 124 MG/DL (ref 74–99)
GLUCOSE SERPL-MCNC: 168 MG/DL (ref 74–99)
GLUCOSE SERPL-MCNC: 312 MG/DL (ref 74–99)
HCT VFR BLD AUTO: 33.6 % (ref 37–47)
HGB BLD-MCNC: 11.5 G/DL (ref 12.5–16)
IMM GRANULOCYTES # BLD AUTO: 0.03 K/UL
IMM GRANULOCYTES NFR BLD: 1 %
LYMPHOCYTES NFR BLD: 1.72 K/UL
LYMPHOCYTES RELATIVE PERCENT: 26 % (ref 24–44)
MAGNESIUM SERPL-MCNC: 1.8 MG/DL (ref 1.8–2.4)
MCH RBC QN AUTO: 30.9 PG (ref 27–31)
MCHC RBC AUTO-ENTMCNC: 34.2 G/DL (ref 32–36)
MCV RBC AUTO: 90.3 FL (ref 78–100)
MONOCYTES NFR BLD: 0.65 K/UL
MONOCYTES NFR BLD: 10 % (ref 0–5)
NEUTROPHILS NFR BLD: 62 % (ref 36–66)
NEUTS SEG NFR BLD: 4.09 K/UL
PHOSPHATE SERPL-MCNC: 3.5 MG/DL (ref 2.5–4.9)
PLATELET # BLD AUTO: 242 K/UL (ref 140–440)
PMV BLD AUTO: 11.1 FL (ref 7.5–11.1)
POTASSIUM SERPL-SCNC: 4.1 MMOL/L (ref 3.5–5.1)
POTASSIUM SERPL-SCNC: 4.1 MMOL/L (ref 3.5–5.1)
POTASSIUM SERPL-SCNC: 4.4 MMOL/L (ref 3.5–5.1)
POTASSIUM SERPL-SCNC: 4.5 MMOL/L (ref 3.5–5.1)
RBC # BLD AUTO: 3.72 M/UL (ref 4.2–5.4)
SODIUM SERPL-SCNC: 124 MMOL/L (ref 136–145)
SODIUM SERPL-SCNC: 125 MMOL/L (ref 136–145)
SODIUM SERPL-SCNC: 127 MMOL/L (ref 136–145)
SODIUM SERPL-SCNC: 130 MMOL/L (ref 136–145)
WBC OTHER # BLD: 6.6 K/UL (ref 4–10.5)

## 2025-06-23 PROCEDURE — 36415 COLL VENOUS BLD VENIPUNCTURE: CPT

## 2025-06-23 PROCEDURE — 84100 ASSAY OF PHOSPHORUS: CPT

## 2025-06-23 PROCEDURE — 6370000000 HC RX 637 (ALT 250 FOR IP): Performed by: INTERNAL MEDICINE

## 2025-06-23 PROCEDURE — 97166 OT EVAL MOD COMPLEX 45 MIN: CPT

## 2025-06-23 PROCEDURE — 83735 ASSAY OF MAGNESIUM: CPT

## 2025-06-23 PROCEDURE — 92610 EVALUATE SWALLOWING FUNCTION: CPT

## 2025-06-23 PROCEDURE — 70450 CT HEAD/BRAIN W/O DYE: CPT

## 2025-06-23 PROCEDURE — 6360000002 HC RX W HCPCS: Performed by: INTERNAL MEDICINE

## 2025-06-23 PROCEDURE — 36556 INSERT NON-TUNNEL CV CATH: CPT

## 2025-06-23 PROCEDURE — 02HV33Z INSERTION OF INFUSION DEVICE INTO SUPERIOR VENA CAVA, PERCUTANEOUS APPROACH: ICD-10-PCS | Performed by: INTERNAL MEDICINE

## 2025-06-23 PROCEDURE — 82088 ASSAY OF ALDOSTERONE: CPT

## 2025-06-23 PROCEDURE — 71045 X-RAY EXAM CHEST 1 VIEW: CPT

## 2025-06-23 PROCEDURE — 94761 N-INVAS EAR/PLS OXIMETRY MLT: CPT

## 2025-06-23 PROCEDURE — 36410 VNPNXR 3YR/> PHY/QHP DX/THER: CPT

## 2025-06-23 PROCEDURE — 83835 ASSAY OF METANEPHRINES: CPT

## 2025-06-23 PROCEDURE — 2500000003 HC RX 250 WO HCPCS: Performed by: INTERNAL MEDICINE

## 2025-06-23 PROCEDURE — 76937 US GUIDE VASCULAR ACCESS: CPT

## 2025-06-23 PROCEDURE — 80048 BASIC METABOLIC PNL TOTAL CA: CPT

## 2025-06-23 PROCEDURE — 97530 THERAPEUTIC ACTIVITIES: CPT

## 2025-06-23 PROCEDURE — 2000000000 HC ICU R&B

## 2025-06-23 PROCEDURE — 85025 COMPLETE CBC W/AUTO DIFF WBC: CPT

## 2025-06-23 PROCEDURE — 6360000002 HC RX W HCPCS: Performed by: NURSE PRACTITIONER

## 2025-06-23 PROCEDURE — 3E03317 INTRODUCTION OF OTHER THROMBOLYTIC INTO PERIPHERAL VEIN, PERCUTANEOUS APPROACH: ICD-10-PCS | Performed by: INTERNAL MEDICINE

## 2025-06-23 PROCEDURE — 97162 PT EVAL MOD COMPLEX 30 MIN: CPT

## 2025-06-23 PROCEDURE — 2500000003 HC RX 250 WO HCPCS: Performed by: NURSE PRACTITIONER

## 2025-06-23 PROCEDURE — C1751 CATH, INF, PER/CENT/MIDLINE: HCPCS

## 2025-06-23 PROCEDURE — 1200000000 HC SEMI PRIVATE

## 2025-06-23 PROCEDURE — 82962 GLUCOSE BLOOD TEST: CPT

## 2025-06-23 PROCEDURE — 84244 ASSAY OF RENIN: CPT

## 2025-06-23 RX ORDER — SODIUM CHLORIDE 0.9 % (FLUSH) 0.9 %
10 SYRINGE (ML) INJECTION ONCE
Status: COMPLETED | OUTPATIENT
Start: 2025-06-23 | End: 2025-06-23

## 2025-06-23 RX ORDER — DEXTROSE MONOHYDRATE 50 MG/ML
INJECTION, SOLUTION INTRAVENOUS CONTINUOUS
Status: DISCONTINUED | OUTPATIENT
Start: 2025-06-23 | End: 2025-06-23

## 2025-06-23 RX ORDER — SODIUM CHLORIDE 9 MG/ML
INJECTION, SOLUTION INTRAVENOUS PRN
Status: CANCELLED | OUTPATIENT
Start: 2025-06-23

## 2025-06-23 RX ORDER — SODIUM CHLORIDE 0.9 % (FLUSH) 0.9 %
5-40 SYRINGE (ML) INJECTION PRN
Status: CANCELLED | OUTPATIENT
Start: 2025-06-23

## 2025-06-23 RX ORDER — SODIUM CHLORIDE 0.9 % (FLUSH) 0.9 %
10 SYRINGE (ML) INJECTION ONCE
Status: CANCELLED | OUTPATIENT
Start: 2025-06-23 | End: 2025-06-23

## 2025-06-23 RX ORDER — SODIUM CHLORIDE 0.9 % (FLUSH) 0.9 %
5-40 SYRINGE (ML) INJECTION PRN
Status: DISCONTINUED | OUTPATIENT
Start: 2025-06-23 | End: 2025-06-26 | Stop reason: HOSPADM

## 2025-06-23 RX ORDER — ONDANSETRON 4 MG/1
4 TABLET, ORALLY DISINTEGRATING ORAL EVERY 8 HOURS PRN
Status: CANCELLED | OUTPATIENT
Start: 2025-06-23

## 2025-06-23 RX ORDER — SPIRONOLACTONE 50 MG/1
25 TABLET, FILM COATED ORAL DAILY
Status: DISCONTINUED | OUTPATIENT
Start: 2025-06-23 | End: 2025-06-26 | Stop reason: HOSPADM

## 2025-06-23 RX ORDER — SODIUM CHLORIDE 0.9 % (FLUSH) 0.9 %
5-40 SYRINGE (ML) INJECTION EVERY 12 HOURS SCHEDULED
Status: CANCELLED | OUTPATIENT
Start: 2025-06-23

## 2025-06-23 RX ORDER — ENOXAPARIN SODIUM 100 MG/ML
40 INJECTION SUBCUTANEOUS DAILY
Status: DISCONTINUED | OUTPATIENT
Start: 2025-06-24 | End: 2025-06-24

## 2025-06-23 RX ORDER — DEXTROSE MONOHYDRATE 25 G/50ML
12.5 INJECTION, SOLUTION INTRAVENOUS
Status: CANCELLED | OUTPATIENT
Start: 2025-06-23

## 2025-06-23 RX ORDER — ENOXAPARIN SODIUM 100 MG/ML
40 INJECTION SUBCUTANEOUS DAILY
Status: CANCELLED | OUTPATIENT
Start: 2025-06-24

## 2025-06-23 RX ORDER — SODIUM CHLORIDE 9 MG/ML
INJECTION, SOLUTION INTRAVENOUS PRN
Status: DISCONTINUED | OUTPATIENT
Start: 2025-06-23 | End: 2025-06-26 | Stop reason: HOSPADM

## 2025-06-23 RX ORDER — DOXAZOSIN 1 MG/1
2 TABLET ORAL EVERY 12 HOURS SCHEDULED
Status: DISCONTINUED | OUTPATIENT
Start: 2025-06-23 | End: 2025-06-26 | Stop reason: HOSPADM

## 2025-06-23 RX ORDER — SODIUM CHLORIDE 0.9 % (FLUSH) 0.9 %
5-40 SYRINGE (ML) INJECTION EVERY 12 HOURS SCHEDULED
Status: DISCONTINUED | OUTPATIENT
Start: 2025-06-23 | End: 2025-06-26 | Stop reason: HOSPADM

## 2025-06-23 RX ORDER — ONDANSETRON 2 MG/ML
4 INJECTION INTRAMUSCULAR; INTRAVENOUS EVERY 6 HOURS PRN
Status: CANCELLED | OUTPATIENT
Start: 2025-06-23

## 2025-06-23 RX ADMIN — SPIRONOLACTONE 25 MG: 50 TABLET ORAL at 08:20

## 2025-06-23 RX ADMIN — ASPIRIN 325 MG: 325 TABLET ORAL at 08:19

## 2025-06-23 RX ADMIN — LOSARTAN POTASSIUM 100 MG: 100 TABLET, FILM COATED ORAL at 05:21

## 2025-06-23 RX ADMIN — PANTOPRAZOLE SODIUM 40 MG: 40 TABLET, DELAYED RELEASE ORAL at 05:21

## 2025-06-23 RX ADMIN — TENECTEPLASE 21 MG: KIT at 14:52

## 2025-06-23 RX ADMIN — SODIUM CHLORIDE, PRESERVATIVE FREE 10 ML: 5 INJECTION INTRAVENOUS at 22:42

## 2025-06-23 RX ADMIN — ATORVASTATIN CALCIUM 20 MG: 10 TABLET, FILM COATED ORAL at 08:20

## 2025-06-23 RX ADMIN — ENOXAPARIN SODIUM 40 MG: 100 INJECTION SUBCUTANEOUS at 08:20

## 2025-06-23 RX ADMIN — HYDRALAZINE HYDROCHLORIDE 25 MG: 25 TABLET ORAL at 03:50

## 2025-06-23 RX ADMIN — VERAPAMIL HYDROCHLORIDE 240 MG: 240 TABLET, FILM COATED, EXTENDED RELEASE ORAL at 08:21

## 2025-06-23 RX ADMIN — DOXAZOSIN 2 MG: 1 TABLET ORAL at 08:19

## 2025-06-23 RX ADMIN — SODIUM CHLORIDE, PRESERVATIVE FREE 10 ML: 5 INJECTION INTRAVENOUS at 14:51

## 2025-06-23 RX ADMIN — DOXAZOSIN 2 MG: 1 TABLET ORAL at 20:57

## 2025-06-23 RX ADMIN — WATER 1000 MG: 1 INJECTION INTRAMUSCULAR; INTRAVENOUS; SUBCUTANEOUS at 22:42

## 2025-06-23 RX ADMIN — SODIUM CHLORIDE, PRESERVATIVE FREE 10 ML: 5 INJECTION INTRAVENOUS at 14:53

## 2025-06-23 RX ADMIN — BUSPIRONE HYDROCHLORIDE 10 MG: 5 TABLET ORAL at 20:57

## 2025-06-23 RX ADMIN — CLOPIDOGREL BISULFATE 75 MG: 75 TABLET, FILM COATED ORAL at 08:19

## 2025-06-23 RX ADMIN — SODIUM CHLORIDE, PRESERVATIVE FREE 10 ML: 5 INJECTION INTRAVENOUS at 08:21

## 2025-06-23 RX ADMIN — BUSPIRONE HYDROCHLORIDE 10 MG: 5 TABLET ORAL at 08:20

## 2025-06-23 ASSESSMENT — ENCOUNTER SYMPTOMS
EYES NEGATIVE: 1
RESPIRATORY NEGATIVE: 1
ALLERGIC/IMMUNOLOGIC NEGATIVE: 1
GASTROINTESTINAL NEGATIVE: 1

## 2025-06-23 NOTE — CONSULTS
Consult completed. AccuCath Ace 20g 2.25\" IV catheter initiated into LUE brachial vein x 1 attempt using sterile ultrasound guided technique. Brisk blood return, flushes without resistance. Patient tolerated well. Primary nurse  notified.     Consult the Vascular Access Team for questions, concerns, or change in patient's condition.            Pupils equal, round and reactive to light, Extra-ocular movement intact, eyes are clear b/l

## 2025-06-23 NOTE — PROCEDURES
PROCEDURE NOTE  Date: 6/23/2025   Name: Natalia Ortiz  YOB: 1945    CENTRAL LINE    Date/Time: 6/23/2025 2:44 PM    Performed by: Vivi Taylor APRN - NP  Authorized by: Vivi Taylor APRN - NP  Consent: Verbal consent obtained.  Consent given by: Daughter in law at bedside.  Required items: required blood products, implants, devices, and special equipment available  Patient identity confirmed: arm band and hospital-assigned identification number  Time out: Immediately prior to procedure a \"time out\" was called to verify the correct patient, procedure, equipment, support staff and site/side marked as required.  Indications: vascular access  Anesthesia: local infiltration    Anesthesia:  Local Anesthetic: lidocaine 1% with epinephrine    Sedation:  Patient sedated: no    Preparation: skin prepped with ChloraPrep  Skin prep agent dried: skin prep agent completely dried prior to procedure  Sterile barriers: all five maximum sterile barriers used - cap, mask, sterile gown, sterile gloves, and large sterile sheet  Hand hygiene: hand hygiene performed prior to central venous catheter insertion  Location details: right internal jugular  Patient position: flat  Catheter type: triple lumen  Catheter size: 7.5 Fr  Ultrasound guidance: yes  Sterile ultrasound techniques: sterile gel and sterile probe covers were used  Number of attempts: 1  Successful placement: yes  Post-procedure: line sutured and dressing applied  Assessment: blood return through all ports, free fluid flow, placement verified by x-ray and no pneumothorax on x-ray  Patient tolerance: patient tolerated the procedure well with no immediate complications

## 2025-06-23 NOTE — SIGNIFICANT EVENT
Stroke alert called at 1156 for aphasia, and right sided weakness    Patient taken straight to CT, CT of head complete    IV in hand, RN trying to place new line in order to complete CTA of head.

## 2025-06-23 NOTE — CONSULTS
[facility] ACUTE CARE PHYSICAL THERAPY EVALUATION  Natalia Ortiz, 1945, 2106/2106-A, 6/23/2025    History  [place]:  The primary encounter diagnosis was Hyponatremia. Diagnoses of Syncope and collapse, Bradycardia, RICO (acute kidney injury), and Hypomagnesemia were also pertinent to this visit.  Patient  has a past medical history of Basal cell carcinoma of skin, Diabetes mellitus (HCC), Diverticulosis, GERD (gastroesophageal reflux disease), Hiatal hernia, History of nuclear stress test, Hyperlipidemia, Hypertension, Pap smear for cervical cancer screening, Pap smear for cervical cancer screening, Pap smear for cervical cancer screening, Respiratory disorder, Sleep apnea, Stroke, acute, thrombotic (HCC), and Venous (peripheral) insufficiency.  Patient  has a past surgical history that includes colectomy; Umbilical hernia repair (2005); Skin cancer excision (7/1/2013); Inguinal hernia repair (Right); Arm Surgery (Left); Bunionectomy (Left); other surgical history (Right, 8/28/15); Cholecystectomy; Ankle fracture surgery (Right); Colonoscopy; and Colonoscopy (11/02/2017).    Recommendation: Facility for moderate post-acute rehabilitation, anticipate 1-2 hours per day and 5 days per week.    Subjective:  Patient states:  \"I get my nails done for me\"   Pain:  denies   Communication with other providers:  RN, co-eval with Bandar MCDOWELL   Restrictions: General precautions, falls     Home Setup/Prior level of function  Social/Functional History  Lives With: Alone  Type of Home: House  Home Layout: Two level, Able to Live on Main level with bedroom/bathroom  Home Access: Stairs to enter with rails  Entrance Stairs - Number of Steps: 2  Bathroom Shower/Tub:  (spongebathes)  Bathroom Toilet: Standard  Bathroom Equipment: Grab bars around toilet  Home Equipment: Cane, Walker - Rolling (mod I w/ cane)  Has the patient had two or more falls in the past year or any fall with injury in the past year?: Yes  Receives Help

## 2025-06-23 NOTE — CONSULTS
Received call from nursing supervisor that midline placed in CT for CT with contrast infiltrated upon initiation of contrast.  Met with patient at bedside upon returning to room.  A PowerGlide Pro midline Catheter was initiated into right upper basilic vein.  Upon deployment of catheter, catheter becomes kinked and unable to advance.  HEMALATHA Trinidad at bedside, advised of three different midlines placed and all midlines not functioning.  Decision made for CVC to be placed.  Please DO NOT remove the CVC until vascular access is obtained, as patient does not have adequate veins in lower arms for conventional PIV access and difficulty obtaining access in upper arms.    Consult the Vascular Access Team for questions, concerns, or change in patient's condition.

## 2025-06-23 NOTE — CARE COORDINATION
Chart reviewed. CM met with pt to initiate discharge planning. CM introduced self and role of CM. Pt has chair alarm in place and is leaning forward on bedside table. Pt establishes eye contact but does not respond verbally. Pt does know her name as she nodded no when given 2 incorrect names and nodded yes to her name, Natalia. Pt asked nurse immediately if pt had been talking earlier and she advised that she was but was delayed. Cm advised pt not speaking at present. Nurse to room and stroke alert called. Cm will follow up at a later time for discharge planning/needs.   06/23/25 1068   Service Assessment   Patient Orientation Unable to Assess;Other (see comment)  (unable to speak/aphasia)   Cognition Alert   History Provided By Medical Record   Primary Caregiver Self   Accompanied By/Relationship N/A   Support Systems Children   Patient's Healthcare Decision Maker is: Legal Next of Kin   PCP Verified by CM Yes   Last Visit to PCP   (unknown)   Prior Functional Level Independent in ADLs/IADLs   Current Functional Level Assistance with the following:;Bathing;Toileting;Mobility  (per hospital protocol)   Can patient return to prior living arrangement Unknown at present   Ability to make needs known: Unable   Family able to assist with home care needs: Other (comment)  (unknown)   Would you like for me to discuss the discharge plan with any other family members/significant others, and if so, who? Yes  (LNOK as needed)   Financial Resources Medicare   Community Resources None   CM/SW Referral Other (see comment)  (discharge planning assessment)

## 2025-06-23 NOTE — FLOWSHEET NOTE
Stroke alert initiated at 1158.  LKW 1135.  Per [facility] recommending TNK; Family not sure about giving the TNK.  Family wanting pt to get MRI first.  CVC obtained and awaiting xray confirmationg.  Pt 's family stated that they are wanting to give the TNK about 1430.  THey were just waiting on the pt's son to give the medication.  Dr Vela called to the bedside.  Checklist completed and consent for TNK signed.  TNK administered at 1452.  Will continue to monitor closely.

## 2025-06-24 ENCOUNTER — APPOINTMENT (OUTPATIENT)
Dept: ULTRASOUND IMAGING | Age: 80
DRG: 640 | End: 2025-06-24
Payer: MEDICARE

## 2025-06-24 ENCOUNTER — APPOINTMENT (OUTPATIENT)
Dept: MRI IMAGING | Age: 80
DRG: 640 | End: 2025-06-24
Payer: MEDICARE

## 2025-06-24 LAB
ANION GAP SERPL CALCULATED.3IONS-SCNC: 13 MMOL/L (ref 9–17)
BACTERIA URNS QL MICRO: ABNORMAL
BASOPHILS # BLD: 0.02 K/UL
BASOPHILS NFR BLD: 0 % (ref 0–1)
BILIRUB UR QL STRIP: NEGATIVE
BUN SERPL-MCNC: 22 MG/DL (ref 7–20)
CALCIUM SERPL-MCNC: 9.3 MG/DL (ref 8.3–10.6)
CHLORIDE SERPL-SCNC: 96 MMOL/L (ref 99–110)
CHOLEST SERPL-MCNC: 115 MG/DL (ref 125–199)
CLARITY UR: ABNORMAL
CO2 SERPL-SCNC: 21 MMOL/L (ref 21–32)
COLOR UR: YELLOW
CREAT SERPL-MCNC: 2.4 MG/DL (ref 0.6–1.2)
CREAT UR-MCNC: 324 MG/DL (ref 28–217)
EKG ATRIAL RATE: 51 BPM
EKG ATRIAL RATE: 61 BPM
EKG DIAGNOSIS: NORMAL
EKG DIAGNOSIS: NORMAL
EKG P AXIS: 60 DEGREES
EKG P AXIS: 84 DEGREES
EKG P-R INTERVAL: 214 MS
EKG P-R INTERVAL: 250 MS
EKG Q-T INTERVAL: 440 MS
EKG Q-T INTERVAL: 478 MS
EKG QRS DURATION: 100 MS
EKG QRS DURATION: 108 MS
EKG QTC CALCULATION (BAZETT): 440 MS
EKG QTC CALCULATION (BAZETT): 442 MS
EKG R AXIS: -26 DEGREES
EKG R AXIS: -32 DEGREES
EKG T AXIS: 10 DEGREES
EKG T AXIS: 53 DEGREES
EKG VENTRICULAR RATE: 51 BPM
EKG VENTRICULAR RATE: 61 BPM
EOSINOPHIL # BLD: 0.02 K/UL
EOSINOPHILS RELATIVE PERCENT: 0 % (ref 0–3)
EPI CELLS #/AREA URNS HPF: 10 /HPF
ERYTHROCYTE [DISTWIDTH] IN BLOOD BY AUTOMATED COUNT: 14.5 % (ref 11.7–14.9)
EST. AVERAGE GLUCOSE BLD GHB EST-MCNC: 131 MG/DL
GFR, ESTIMATED: 19 ML/MIN/1.73M2
GLUCOSE BLD-MCNC: 159 MG/DL (ref 74–99)
GLUCOSE BLD-MCNC: 209 MG/DL (ref 74–99)
GLUCOSE SERPL-MCNC: 150 MG/DL (ref 74–99)
GLUCOSE UR STRIP-MCNC: NEGATIVE MG/DL
HBA1C MFR BLD: 6.2 % (ref 4.2–6.3)
HCT VFR BLD AUTO: 28.1 % (ref 37–47)
HDLC SERPL-MCNC: 39 MG/DL
HGB BLD-MCNC: 9.6 G/DL (ref 12.5–16)
HGB UR QL STRIP.AUTO: ABNORMAL
IMM GRANULOCYTES # BLD AUTO: 0.03 K/UL
IMM GRANULOCYTES NFR BLD: 0 %
KETONES UR STRIP-MCNC: ABNORMAL MG/DL
LDLC SERPL CALC-MCNC: 55 MG/DL
LEUKOCYTE ESTERASE UR QL STRIP: ABNORMAL
LYMPHOCYTES NFR BLD: 1.34 K/UL
LYMPHOCYTES RELATIVE PERCENT: 17 % (ref 24–44)
MAGNESIUM SERPL-MCNC: 1.9 MG/DL (ref 1.8–2.4)
MCH RBC QN AUTO: 30.6 PG (ref 27–31)
MCHC RBC AUTO-ENTMCNC: 34.2 G/DL (ref 32–36)
MCV RBC AUTO: 89.5 FL (ref 78–100)
MICROORGANISM SPEC CULT: ABNORMAL
MONOCYTES NFR BLD: 0.62 K/UL
MONOCYTES NFR BLD: 8 % (ref 0–5)
MUCOUS THREADS URNS QL MICRO: ABNORMAL
NEUTROPHILS NFR BLD: 74 % (ref 36–66)
NEUTS SEG NFR BLD: 5.73 K/UL
NITRITE UR QL STRIP: NEGATIVE
PA ADP PRP-ACNC: 248 PRU
PH UR STRIP: 5.5 [PH] (ref 5–8)
PHOSPHATE SERPL-MCNC: 4.4 MG/DL (ref 2.5–4.9)
PLATELET # BLD AUTO: 218 K/UL (ref 140–440)
PLT FUNCTION ASPIRIN: 399 ARU
PMV BLD AUTO: 10.9 FL (ref 7.5–11.1)
POTASSIUM SERPL-SCNC: 4.2 MMOL/L (ref 3.5–5.1)
PROT UR STRIP-MCNC: 30 MG/DL
RBC # BLD AUTO: 3.14 M/UL (ref 4.2–5.4)
RBC #/AREA URNS HPF: 33 /HPF (ref 0–2)
SODIUM SERPL-SCNC: 129 MMOL/L (ref 136–145)
SODIUM UR-SCNC: 22 MMOL/L (ref 40–220)
SP GR UR STRIP: 1.02 (ref 1–1.03)
SPECIMEN DESCRIPTION: ABNORMAL
TOTAL PROTEIN, URINE: 68 MG/DL
TRIGL SERPL-MCNC: 106 MG/DL
URINE TOTAL PROTEIN CREATININE RATIO: 0.21 (ref 0–0.2)
UROBILINOGEN UR STRIP-ACNC: 0.2 EU/DL (ref 0–1)
WBC #/AREA URNS HPF: 86 /HPF (ref 0–5)
WBC OTHER # BLD: 7.8 K/UL (ref 4–10.5)

## 2025-06-24 PROCEDURE — 84156 ASSAY OF PROTEIN URINE: CPT

## 2025-06-24 PROCEDURE — 6370000000 HC RX 637 (ALT 250 FOR IP): Performed by: INTERNAL MEDICINE

## 2025-06-24 PROCEDURE — 97535 SELF CARE MNGMENT TRAINING: CPT

## 2025-06-24 PROCEDURE — 94761 N-INVAS EAR/PLS OXIMETRY MLT: CPT

## 2025-06-24 PROCEDURE — 92526 ORAL FUNCTION THERAPY: CPT

## 2025-06-24 PROCEDURE — 2580000003 HC RX 258: Performed by: SPECIALIST

## 2025-06-24 PROCEDURE — 83735 ASSAY OF MAGNESIUM: CPT

## 2025-06-24 PROCEDURE — 2500000003 HC RX 250 WO HCPCS: Performed by: INTERNAL MEDICINE

## 2025-06-24 PROCEDURE — 6370000000 HC RX 637 (ALT 250 FOR IP): Performed by: STUDENT IN AN ORGANIZED HEALTH CARE EDUCATION/TRAINING PROGRAM

## 2025-06-24 PROCEDURE — 82962 GLUCOSE BLOOD TEST: CPT

## 2025-06-24 PROCEDURE — 85576 BLOOD PLATELET AGGREGATION: CPT

## 2025-06-24 PROCEDURE — 2000000000 HC ICU R&B

## 2025-06-24 PROCEDURE — 70551 MRI BRAIN STEM W/O DYE: CPT

## 2025-06-24 PROCEDURE — 97168 OT RE-EVAL EST PLAN CARE: CPT

## 2025-06-24 PROCEDURE — 81001 URINALYSIS AUTO W/SCOPE: CPT

## 2025-06-24 PROCEDURE — 51798 US URINE CAPACITY MEASURE: CPT

## 2025-06-24 PROCEDURE — 6360000002 HC RX W HCPCS

## 2025-06-24 PROCEDURE — 70544 MR ANGIOGRAPHY HEAD W/O DYE: CPT

## 2025-06-24 PROCEDURE — 99223 1ST HOSP IP/OBS HIGH 75: CPT | Performed by: STUDENT IN AN ORGANIZED HEALTH CARE EDUCATION/TRAINING PROGRAM

## 2025-06-24 PROCEDURE — 84100 ASSAY OF PHOSPHORUS: CPT

## 2025-06-24 PROCEDURE — 97530 THERAPEUTIC ACTIVITIES: CPT

## 2025-06-24 PROCEDURE — 82570 ASSAY OF URINE CREATININE: CPT

## 2025-06-24 PROCEDURE — 85025 COMPLETE CBC W/AUTO DIFF WBC: CPT

## 2025-06-24 PROCEDURE — 6370000000 HC RX 637 (ALT 250 FOR IP): Performed by: SPECIALIST

## 2025-06-24 PROCEDURE — 83036 HEMOGLOBIN GLYCOSYLATED A1C: CPT

## 2025-06-24 PROCEDURE — 2500000003 HC RX 250 WO HCPCS: Performed by: NURSE PRACTITIONER

## 2025-06-24 PROCEDURE — 80048 BASIC METABOLIC PNL TOTAL CA: CPT

## 2025-06-24 PROCEDURE — 84300 ASSAY OF URINE SODIUM: CPT

## 2025-06-24 PROCEDURE — 93010 ELECTROCARDIOGRAM REPORT: CPT | Performed by: INTERNAL MEDICINE

## 2025-06-24 PROCEDURE — 6360000002 HC RX W HCPCS: Performed by: SPECIALIST

## 2025-06-24 PROCEDURE — 97164 PT RE-EVAL EST PLAN CARE: CPT

## 2025-06-24 PROCEDURE — 80061 LIPID PANEL: CPT

## 2025-06-24 PROCEDURE — 36415 COLL VENOUS BLD VENIPUNCTURE: CPT

## 2025-06-24 RX ORDER — ATORVASTATIN CALCIUM 10 MG/1
20 TABLET, FILM COATED ORAL DAILY
Status: DISCONTINUED | OUTPATIENT
Start: 2025-06-25 | End: 2025-06-26 | Stop reason: HOSPADM

## 2025-06-24 RX ORDER — ENOXAPARIN SODIUM 100 MG/ML
30 INJECTION SUBCUTANEOUS EVERY 24 HOURS
Status: DISCONTINUED | OUTPATIENT
Start: 2025-06-24 | End: 2025-06-25

## 2025-06-24 RX ORDER — TICAGRELOR 90 MG/1
90 TABLET, FILM COATED ORAL 2 TIMES DAILY
Status: DISCONTINUED | OUTPATIENT
Start: 2025-06-24 | End: 2025-06-26 | Stop reason: HOSPADM

## 2025-06-24 RX ORDER — ATORVASTATIN CALCIUM 40 MG/1
80 TABLET, FILM COATED ORAL DAILY
Status: DISCONTINUED | OUTPATIENT
Start: 2025-06-24 | End: 2025-06-24

## 2025-06-24 RX ADMIN — BUSPIRONE HYDROCHLORIDE 10 MG: 5 TABLET ORAL at 17:26

## 2025-06-24 RX ADMIN — BUSPIRONE HYDROCHLORIDE 10 MG: 5 TABLET ORAL at 09:41

## 2025-06-24 RX ADMIN — SPIRONOLACTONE 25 MG: 50 TABLET ORAL at 09:40

## 2025-06-24 RX ADMIN — LOSARTAN POTASSIUM 100 MG: 100 TABLET, FILM COATED ORAL at 09:41

## 2025-06-24 RX ADMIN — MEROPENEM 500 MG: 500 INJECTION, POWDER, FOR SOLUTION INTRAVENOUS at 21:50

## 2025-06-24 RX ADMIN — SODIUM CHLORIDE, PRESERVATIVE FREE 10 ML: 5 INJECTION INTRAVENOUS at 21:50

## 2025-06-24 RX ADMIN — DOXAZOSIN 2 MG: 1 TABLET ORAL at 21:51

## 2025-06-24 RX ADMIN — ENOXAPARIN SODIUM 30 MG: 100 INJECTION SUBCUTANEOUS at 17:26

## 2025-06-24 RX ADMIN — MEROPENEM 500 MG: 500 INJECTION, POWDER, FOR SOLUTION INTRAVENOUS at 12:00

## 2025-06-24 RX ADMIN — SODIUM CHLORIDE, PRESERVATIVE FREE 10 ML: 5 INJECTION INTRAVENOUS at 09:41

## 2025-06-24 RX ADMIN — VERAPAMIL HYDROCHLORIDE 240 MG: 240 TABLET, FILM COATED, EXTENDED RELEASE ORAL at 09:40

## 2025-06-24 RX ADMIN — BUSPIRONE HYDROCHLORIDE 10 MG: 5 TABLET ORAL at 21:51

## 2025-06-24 RX ADMIN — TICAGRELOR 90 MG: 90 TABLET ORAL at 21:51

## 2025-06-24 RX ADMIN — SODIUM CHLORIDE, PRESERVATIVE FREE 10 ML: 5 INJECTION INTRAVENOUS at 21:51

## 2025-06-24 RX ADMIN — ATORVASTATIN CALCIUM 80 MG: 40 TABLET, FILM COATED ORAL at 09:40

## 2025-06-24 RX ADMIN — PANTOPRAZOLE SODIUM 40 MG: 40 TABLET, DELAYED RELEASE ORAL at 06:29

## 2025-06-24 RX ADMIN — DOXAZOSIN 2 MG: 1 TABLET ORAL at 09:41

## 2025-06-24 ASSESSMENT — ENCOUNTER SYMPTOMS
ALLERGIC/IMMUNOLOGIC NEGATIVE: 1
RESPIRATORY NEGATIVE: 1
EYES NEGATIVE: 1
GASTROINTESTINAL NEGATIVE: 1

## 2025-06-24 NOTE — CONSULTS
[facility] ACUTE CARE PHYSICAL THERAPY RE-EVALUATION  Natalia Ortiz, 1945, 2106/2106-A, 6/24/2025    History  [place]:  The primary encounter diagnosis was Hyponatremia. Diagnoses of Syncope and collapse, Bradycardia, RICO (acute kidney injury), Hypomagnesemia, and Stroke, acute, thrombotic (HCC) were also pertinent to this visit.  Patient  has a past medical history of Basal cell carcinoma of skin, Diabetes mellitus (HCC), Diverticulosis, GERD (gastroesophageal reflux disease), Hiatal hernia, History of nuclear stress test, Hyperlipidemia, Hypertension, Pap smear for cervical cancer screening, Pap smear for cervical cancer screening, Pap smear for cervical cancer screening, Respiratory disorder, Sleep apnea, Stroke, acute, thrombotic (HCC), and Venous (peripheral) insufficiency.  Patient  has a past surgical history that includes colectomy; Umbilical hernia repair (2005); Skin cancer excision (7/1/2013); Inguinal hernia repair (Right); Arm Surgery (Left); Bunionectomy (Left); other surgical history (Right, 8/28/15); Cholecystectomy; Ankle fracture surgery (Right); Colonoscopy; and Colonoscopy (11/02/2017).    Recommendation: Facility for moderate post-acute rehabilitation, anticipate 1-2 hours per day and 5 days per week.    Subjective:  Patient states:  \"Hi\"   Pain:  denies   Communication with other providers:  Handoff to RN, co-eval with Bandar MCDOWELL   Restrictions: General precautions, falls     Home Setup/Prior level of function  Social/Functional History  Lives With: Alone  Type of Home: House  Home Layout: Two level, Able to Live on Main level with bedroom/bathroom  Home Access: Stairs to enter with rails  Entrance Stairs - Number of Steps: 2  Bathroom Shower/Tub:  (spongebathes)  Bathroom Toilet: Standard  Bathroom Equipment: Grab bars around toilet  Home Equipment: Cane, Walker - Rolling (mod I w/ cane)  Has the patient had two or more falls in the past year or any fall with injury in the past

## 2025-06-24 NOTE — CONSULTS
24 hour CVC line rounding completed. Sterile dressing change completed per protocol. Patient continues to meet the following criteria for central vascular access: Limited/no vessel suitable for conventional peripheral access    Consult the Vascular Access Team for questions, concerns, or change in patient's condition.

## 2025-06-24 NOTE — CARE COORDINATION
Follow up visit with pt today. Pt is now able to speak since receiving TNK yesterday. Pt's dgt from [place], dgt in [facility] and [facility] at bedside. Family advises that pt does receive 3hrs /day assistance 7 days/wk from [facility]. CM discussed initial PT/OT eval in the am of 6/23 with rec for SNF prior to stroke alert. Cm spoke with PT/OT this morning and they were going to re-eval pt today in light of new events and CM will follow for possible appropriateness for ARU vs SNF. Dgt in law provided with list of SNFs and HC agencies to review. Dgt in law states that she is also concerned that pt may need assisted living after her rehab, Dgt in law provided with list of Assisted Living facilities.

## 2025-06-24 NOTE — PLAN OF CARE
Problem: Chronic Conditions and Co-morbidities  Goal: Patient's chronic conditions and co-morbidity symptoms are monitored and maintained or improved  Outcome: Progressing  Flowsheets  Taken 6/23/2025 1600 by Kathrine Carty RN  Care Plan - Patient's Chronic Conditions and Co-Morbidity Symptoms are Monitored and Maintained or Improved: Monitor and assess patient's chronic conditions and comorbid symptoms for stability, deterioration, or improvement  Taken 6/23/2025 1300 by Kathrine Carty RN  Care Plan - Patient's Chronic Conditions and Co-Morbidity Symptoms are Monitored and Maintained or Improved: Monitor and assess patient's chronic conditions and comorbid symptoms for stability, deterioration, or improvement  Taken 6/23/2025 1135 by Kathrine Carty RN  Care Plan - Patient's Chronic Conditions and Co-Morbidity Symptoms are Monitored and Maintained or Improved: Monitor and assess patient's chronic conditions and comorbid symptoms for stability, deterioration, or improvement     Problem: Safety - Adult  Goal: Free from fall injury  Outcome: Progressing     Problem: Skin/Tissue Integrity  Goal: Skin integrity remains intact  Description: 1.  Monitor for areas of redness and/or skin breakdown  2.  Assess vascular access sites hourly  3.  Every 4-6 hours minimum:  Change oxygen saturation probe site  4.  Every 4-6 hours:  If on nasal continuous positive airway pressure, respiratory therapy assess nares and determine need for appliance change or resting period  Outcome: Progressing  Flowsheets  Taken 6/23/2025 1600 by Kathrine Carty RN  Skin Integrity Remains Intact: Monitor for areas of redness and/or skin breakdown  Taken 6/23/2025 1300 by Kathrine Carty RN  Skin Integrity Remains Intact: Monitor for areas of redness and/or skin breakdown  Taken 6/23/2025 1135 by Kathrine Carty RN  Skin Integrity Remains Intact: Monitor for areas of redness and/or skin breakdown     Problem: Discharge Planning  Goal:

## 2025-06-24 NOTE — CONSULTS
Neurology Service Consult Note  [facility]   Patient Name: Natalia Ortiz  : 1945        Subjective:   Reason for consult: Stroke symptoms s/p TNK  79 y.o. female with history of DM, basal cell carcinoma skin, HLD, HTN, [place]LANDO on CPAP, stroke, presenting to [facility] who presents initially after being found down at home on 25. She was treated for hyponatremia () felt secondary to HCTZ use and was improving with plan to transfer out of ICU when she had an abrupt onset of right sided weakness and aphasia. RN reports that she was not able to say any words but was attentive to voice. Stroke alert was called  and while patient was undergoing initial CT testing started to have slow improvement in speech described as one word at a time and then slowly able to add more words. Patient was evaluated by [facility] tele neurology. Initial NIH 6. TNK was administered 25 at 1500.   On exam today patient is awake and alert. Speech is improved, but remains slow. Per daughter and granddaughter at bedside she is back to her baseline. She denies any weakness or sensory change today. Patient reports compliance with home medications. Daughter at bedside reports that she is not always an accurate historian. CTA head and neck was not completed due to IV access however given RICO on CKD will plan to complete MRA at time of MRI.     On chart review patient had initial stroke 2011 with right facial weakness, double vision and gait disturbance. She then developed left upper extremity weakness. She was hypertensive (220/64) and MRI imagine revealed lacunar infarct in the right midbrain as well as possible chronic lacunar infarcts in the bilateral corona radiata.Per family she does have history of residual slow speech and delayed processing. She did have a second () for acute onset of expressive aphasia described as not being able to get words out. MRI was negative for acute stroke at

## 2025-06-25 ENCOUNTER — APPOINTMENT (OUTPATIENT)
Dept: MRI IMAGING | Age: 80
DRG: 640 | End: 2025-06-25
Payer: MEDICARE

## 2025-06-25 LAB
ALBUMIN SERPL-MCNC: 3.6 G/DL (ref 3.4–5)
ALBUMIN/GLOB SERPL: 1.6 {RATIO} (ref 1.1–2.2)
ALDOST SERPL-MCNC: 13.4 NG/DL
ALDOST SERPL-MCNC: 26.6 NG/DL
ALDOSTERONE COMMENT: NORMAL
ALDOSTERONE COMMENT: NORMAL
ALP SERPL-CCNC: 73 U/L (ref 40–129)
ALT SERPL-CCNC: 17 U/L (ref 10–40)
ANION GAP SERPL CALCULATED.3IONS-SCNC: 11 MMOL/L (ref 9–17)
ANION GAP SERPL CALCULATED.3IONS-SCNC: 12 MMOL/L (ref 9–17)
AST SERPL-CCNC: 15 U/L (ref 15–37)
BASOPHILS # BLD: 0.03 K/UL
BASOPHILS NFR BLD: 0 % (ref 0–1)
BILIRUB DIRECT SERPL-MCNC: 0.3 MG/DL (ref 0–0.3)
BILIRUB INDIRECT SERPL-MCNC: 0.3 MG/DL (ref 0–0.7)
BILIRUB SERPL-MCNC: 0.6 MG/DL (ref 0–1)
BUN SERPL-MCNC: 23 MG/DL (ref 7–20)
BUN SERPL-MCNC: 23 MG/DL (ref 7–20)
CALCIUM SERPL-MCNC: 8.7 MG/DL (ref 8.3–10.6)
CALCIUM SERPL-MCNC: 9.2 MG/DL (ref 8.3–10.6)
CHLORIDE SERPL-SCNC: 100 MMOL/L (ref 99–110)
CHLORIDE SERPL-SCNC: 97 MMOL/L (ref 99–110)
CO2 SERPL-SCNC: 21 MMOL/L (ref 21–32)
CO2 SERPL-SCNC: 22 MMOL/L (ref 21–32)
CREAT SERPL-MCNC: 1.6 MG/DL (ref 0.6–1.2)
CREAT SERPL-MCNC: 2 MG/DL (ref 0.6–1.2)
EOSINOPHIL # BLD: 0.18 K/UL
EOSINOPHILS RELATIVE PERCENT: 2 % (ref 0–3)
ERYTHROCYTE [DISTWIDTH] IN BLOOD BY AUTOMATED COUNT: 14.4 % (ref 11.7–14.9)
GFR, ESTIMATED: 24 ML/MIN/1.73M2
GFR, ESTIMATED: 30 ML/MIN/1.73M2
GLUCOSE BLD-MCNC: 130 MG/DL (ref 74–99)
GLUCOSE BLD-MCNC: 136 MG/DL (ref 74–99)
GLUCOSE SERPL-MCNC: 130 MG/DL (ref 74–99)
GLUCOSE SERPL-MCNC: 131 MG/DL (ref 74–99)
HCT VFR BLD AUTO: 26.8 % (ref 37–47)
HGB BLD-MCNC: 9 G/DL (ref 12.5–16)
IMM GRANULOCYTES # BLD AUTO: 0.02 K/UL
IMM GRANULOCYTES NFR BLD: 0 %
LYMPHOCYTES NFR BLD: 1.71 K/UL
LYMPHOCYTES RELATIVE PERCENT: 23 % (ref 24–44)
MAGNESIUM SERPL-MCNC: 1.9 MG/DL (ref 1.8–2.4)
MCH RBC QN AUTO: 30.3 PG (ref 27–31)
MCHC RBC AUTO-ENTMCNC: 33.6 G/DL (ref 32–36)
MCV RBC AUTO: 90.2 FL (ref 78–100)
MONOCYTES NFR BLD: 0.63 K/UL
MONOCYTES NFR BLD: 8 % (ref 0–5)
NEUTROPHILS NFR BLD: 66 % (ref 36–66)
NEUTS SEG NFR BLD: 4.93 K/UL
PHOSPHATE SERPL-MCNC: 4 MG/DL (ref 2.5–4.9)
PLATELET # BLD AUTO: 204 K/UL (ref 140–440)
PMV BLD AUTO: 10.4 FL (ref 7.5–11.1)
POTASSIUM SERPL-SCNC: 4.1 MMOL/L (ref 3.5–5.1)
POTASSIUM SERPL-SCNC: 4.2 MMOL/L (ref 3.5–5.1)
PROT SERPL-MCNC: 5.7 G/DL (ref 6.4–8.2)
RBC # BLD AUTO: 2.97 M/UL (ref 4.2–5.4)
SODIUM SERPL-SCNC: 130 MMOL/L (ref 136–145)
SODIUM SERPL-SCNC: 133 MMOL/L (ref 136–145)
WBC OTHER # BLD: 7.5 K/UL (ref 4–10.5)

## 2025-06-25 PROCEDURE — 2500000003 HC RX 250 WO HCPCS: Performed by: NURSE PRACTITIONER

## 2025-06-25 PROCEDURE — 82962 GLUCOSE BLOOD TEST: CPT

## 2025-06-25 PROCEDURE — 6370000000 HC RX 637 (ALT 250 FOR IP): Performed by: INTERNAL MEDICINE

## 2025-06-25 PROCEDURE — 6370000000 HC RX 637 (ALT 250 FOR IP): Performed by: NURSE PRACTITIONER

## 2025-06-25 PROCEDURE — 6360000002 HC RX W HCPCS: Performed by: INTERNAL MEDICINE

## 2025-06-25 PROCEDURE — 82248 BILIRUBIN DIRECT: CPT

## 2025-06-25 PROCEDURE — 85025 COMPLETE CBC W/AUTO DIFF WBC: CPT

## 2025-06-25 PROCEDURE — 2580000003 HC RX 258: Performed by: SPECIALIST

## 2025-06-25 PROCEDURE — 6360000002 HC RX W HCPCS: Performed by: STUDENT IN AN ORGANIZED HEALTH CARE EDUCATION/TRAINING PROGRAM

## 2025-06-25 PROCEDURE — 99232 SBSQ HOSP IP/OBS MODERATE 35: CPT | Performed by: NURSE PRACTITIONER

## 2025-06-25 PROCEDURE — 2500000003 HC RX 250 WO HCPCS: Performed by: INTERNAL MEDICINE

## 2025-06-25 PROCEDURE — 83735 ASSAY OF MAGNESIUM: CPT

## 2025-06-25 PROCEDURE — 1200000000 HC SEMI PRIVATE

## 2025-06-25 PROCEDURE — 92526 ORAL FUNCTION THERAPY: CPT | Performed by: SPEECH-LANGUAGE PATHOLOGIST

## 2025-06-25 PROCEDURE — 92523 SPEECH SOUND LANG COMPREHEN: CPT | Performed by: SPEECH-LANGUAGE PATHOLOGIST

## 2025-06-25 PROCEDURE — 6360000002 HC RX W HCPCS: Performed by: SPECIALIST

## 2025-06-25 PROCEDURE — 6370000000 HC RX 637 (ALT 250 FOR IP): Performed by: STUDENT IN AN ORGANIZED HEALTH CARE EDUCATION/TRAINING PROGRAM

## 2025-06-25 PROCEDURE — 97110 THERAPEUTIC EXERCISES: CPT

## 2025-06-25 PROCEDURE — 70547 MR ANGIOGRAPHY NECK W/O DYE: CPT

## 2025-06-25 PROCEDURE — 97530 THERAPEUTIC ACTIVITIES: CPT

## 2025-06-25 PROCEDURE — 80048 BASIC METABOLIC PNL TOTAL CA: CPT

## 2025-06-25 PROCEDURE — 84100 ASSAY OF PHOSPHORUS: CPT

## 2025-06-25 PROCEDURE — 97535 SELF CARE MNGMENT TRAINING: CPT

## 2025-06-25 PROCEDURE — 2580000003 HC RX 258: Performed by: INTERNAL MEDICINE

## 2025-06-25 PROCEDURE — 94761 N-INVAS EAR/PLS OXIMETRY MLT: CPT

## 2025-06-25 PROCEDURE — 80053 COMPREHEN METABOLIC PANEL: CPT

## 2025-06-25 RX ORDER — HEPARIN SODIUM 5000 [USP'U]/ML
5000 INJECTION, SOLUTION INTRAVENOUS; SUBCUTANEOUS EVERY 8 HOURS SCHEDULED
Status: DISCONTINUED | OUTPATIENT
Start: 2025-06-25 | End: 2025-06-26 | Stop reason: HOSPADM

## 2025-06-25 RX ADMIN — LOSARTAN POTASSIUM 100 MG: 100 TABLET, FILM COATED ORAL at 09:13

## 2025-06-25 RX ADMIN — MEROPENEM 500 MG: 500 INJECTION, POWDER, FOR SOLUTION INTRAVENOUS at 20:51

## 2025-06-25 RX ADMIN — DOXAZOSIN 2 MG: 1 TABLET ORAL at 09:13

## 2025-06-25 RX ADMIN — TICAGRELOR 90 MG: 90 TABLET ORAL at 09:13

## 2025-06-25 RX ADMIN — HEPARIN SODIUM 5000 UNITS: 5000 INJECTION INTRAVENOUS; SUBCUTANEOUS at 20:45

## 2025-06-25 RX ADMIN — BUSPIRONE HYDROCHLORIDE 10 MG: 5 TABLET ORAL at 09:13

## 2025-06-25 RX ADMIN — TICAGRELOR 90 MG: 90 TABLET ORAL at 20:45

## 2025-06-25 RX ADMIN — ASPIRIN 325 MG: 325 TABLET ORAL at 09:13

## 2025-06-25 RX ADMIN — BUSPIRONE HYDROCHLORIDE 10 MG: 5 TABLET ORAL at 20:45

## 2025-06-25 RX ADMIN — PANTOPRAZOLE SODIUM 40 MG: 40 TABLET, DELAYED RELEASE ORAL at 06:36

## 2025-06-25 RX ADMIN — NICARDIPINE HYDROCHLORIDE 5 MG/HR: 25 INJECTION INTRAVENOUS at 01:37

## 2025-06-25 RX ADMIN — SODIUM CHLORIDE, PRESERVATIVE FREE 10 ML: 5 INJECTION INTRAVENOUS at 20:46

## 2025-06-25 RX ADMIN — SODIUM CHLORIDE, PRESERVATIVE FREE 10 ML: 5 INJECTION INTRAVENOUS at 09:14

## 2025-06-25 RX ADMIN — ATORVASTATIN CALCIUM 20 MG: 10 TABLET, FILM COATED ORAL at 09:13

## 2025-06-25 RX ADMIN — BUSPIRONE HYDROCHLORIDE 10 MG: 5 TABLET ORAL at 15:33

## 2025-06-25 RX ADMIN — SODIUM CHLORIDE, PRESERVATIVE FREE 10 ML: 5 INJECTION INTRAVENOUS at 09:17

## 2025-06-25 RX ADMIN — DOXAZOSIN 2 MG: 1 TABLET ORAL at 20:45

## 2025-06-25 RX ADMIN — SODIUM CHLORIDE, PRESERVATIVE FREE 10 ML: 5 INJECTION INTRAVENOUS at 20:52

## 2025-06-25 RX ADMIN — SPIRONOLACTONE 25 MG: 50 TABLET ORAL at 09:13

## 2025-06-25 RX ADMIN — MEROPENEM 500 MG: 500 INJECTION, POWDER, FOR SOLUTION INTRAVENOUS at 09:27

## 2025-06-25 RX ADMIN — HEPARIN SODIUM 5000 UNITS: 5000 INJECTION INTRAVENOUS; SUBCUTANEOUS at 15:33

## 2025-06-25 RX ADMIN — VERAPAMIL HYDROCHLORIDE 240 MG: 240 TABLET, FILM COATED, EXTENDED RELEASE ORAL at 09:17

## 2025-06-25 NOTE — PLAN OF CARE
Problem: Chronic Conditions and Co-morbidities  Goal: Patient's chronic conditions and co-morbidity symptoms are monitored and maintained or improved  6/25/2025 1905 by Hanh Ann RN  Outcome: Progressing  6/25/2025 1903 by Hanh Ann RN  Outcome: Progressing  Flowsheets (Taken 6/25/2025 0800 by Kathrine Carty RN)  Care Plan - Patient's Chronic Conditions and Co-Morbidity Symptoms are Monitored and Maintained or Improved: Monitor and assess patient's chronic conditions and comorbid symptoms for stability, deterioration, or improvement     Problem: Discharge Planning  Goal: Discharge to home or other facility with appropriate resources  6/25/2025 1905 by Hanh Ann RN  Outcome: Progressing  6/25/2025 1903 by Hanh Ann RN  Outcome: Progressing  Flowsheets (Taken 6/25/2025 0800 by Kathrine Carty RN)  Discharge to home or other facility with appropriate resources: Identify barriers to discharge with patient and caregiver     Problem: Safety - Adult  Goal: Free from fall injury  6/25/2025 1905 by Hanh Ann RN  Outcome: Progressing  6/25/2025 1903 by Hanh Ann RN  Outcome: Progressing     Problem: Skin/Tissue Integrity  Goal: Skin integrity remains intact  Description: 1.  Monitor for areas of redness and/or skin breakdown  2.  Assess vascular access sites hourly  3.  Every 4-6 hours minimum:  Change oxygen saturation probe site  4.  Every 4-6 hours:  If on nasal continuous positive airway pressure, respiratory therapy assess nares and determine need for appliance change or resting period  6/25/2025 1905 by Hanh Ann RN  Outcome: Progressing  6/25/2025 1903 by Hanh Ann RN  Outcome: Progressing  Flowsheets  Taken 6/25/2025 1200 by Kathrine Carty RN  Skin Integrity Remains Intact: Monitor for areas of redness and/or skin breakdown  Taken 6/25/2025 0800 by Kathrine Carty RN  Skin Integrity Remains Intact: Monitor for areas of redness and/or skin breakdown

## 2025-06-25 NOTE — PLAN OF CARE
Problem: Chronic Conditions and Co-morbidities  Goal: Patient's chronic conditions and co-morbidity symptoms are monitored and maintained or improved  Outcome: Progressing  Flowsheets (Taken 6/25/2025 0800 by Kathrine Carty RN)  Care Plan - Patient's Chronic Conditions and Co-Morbidity Symptoms are Monitored and Maintained or Improved: Monitor and assess patient's chronic conditions and comorbid symptoms for stability, deterioration, or improvement     Problem: Discharge Planning  Goal: Discharge to home or other facility with appropriate resources  Outcome: Progressing  Flowsheets (Taken 6/25/2025 0800 by Kathrien Carty RN)  Discharge to home or other facility with appropriate resources: Identify barriers to discharge with patient and caregiver     Problem: Safety - Adult  Goal: Free from fall injury  Outcome: Progressing     Problem: Skin/Tissue Integrity  Goal: Skin integrity remains intact  Description: 1.  Monitor for areas of redness and/or skin breakdown  2.  Assess vascular access sites hourly  3.  Every 4-6 hours minimum:  Change oxygen saturation probe site  4.  Every 4-6 hours:  If on nasal continuous positive airway pressure, respiratory therapy assess nares and determine need for appliance change or resting period  Outcome: Progressing  Flowsheets  Taken 6/25/2025 1200 by Kathrine Carty RN  Skin Integrity Remains Intact: Monitor for areas of redness and/or skin breakdown  Taken 6/25/2025 0800 by Kathrine Carty RN  Skin Integrity Remains Intact: Monitor for areas of redness and/or skin breakdown     Problem: ABCDS Injury Assessment  Goal: Absence of physical injury  Outcome: Progressing

## 2025-06-26 VITALS
BODY MASS INDEX: 30.38 KG/M2 | HEIGHT: 65 IN | TEMPERATURE: 97 F | RESPIRATION RATE: 21 BRPM | OXYGEN SATURATION: 98 % | HEART RATE: 96 BPM | SYSTOLIC BLOOD PRESSURE: 158 MMHG | WEIGHT: 182.32 LBS | DIASTOLIC BLOOD PRESSURE: 84 MMHG

## 2025-06-26 PROBLEM — E83.42 HYPOMAGNESEMIA: Status: ACTIVE | Noted: 2025-06-26

## 2025-06-26 PROBLEM — N17.9 AKI (ACUTE KIDNEY INJURY): Status: ACTIVE | Noted: 2025-06-26

## 2025-06-26 PROBLEM — N39.0 COMPLICATED UTI (URINARY TRACT INFECTION): Status: ACTIVE | Noted: 2025-06-26

## 2025-06-26 LAB
ANION GAP SERPL CALCULATED.3IONS-SCNC: 12 MMOL/L (ref 9–17)
BASOPHILS # BLD: 0.04 K/UL
BASOPHILS NFR BLD: 1 % (ref 0–1)
BUN SERPL-MCNC: 20 MG/DL (ref 7–20)
CALCIUM SERPL-MCNC: 8.8 MG/DL (ref 8.3–10.6)
CHLORIDE SERPL-SCNC: 101 MMOL/L (ref 99–110)
CO2 SERPL-SCNC: 21 MMOL/L (ref 21–32)
CREAT SERPL-MCNC: 1.3 MG/DL (ref 0.6–1.2)
EOSINOPHIL # BLD: 0.23 K/UL
EOSINOPHILS RELATIVE PERCENT: 3 % (ref 0–3)
ERYTHROCYTE [DISTWIDTH] IN BLOOD BY AUTOMATED COUNT: 14.2 % (ref 11.7–14.9)
GFR, ESTIMATED: 39 ML/MIN/1.73M2
GLUCOSE BLD-MCNC: 148 MG/DL (ref 74–99)
GLUCOSE BLD-MCNC: 150 MG/DL (ref 74–99)
GLUCOSE SERPL-MCNC: 142 MG/DL (ref 74–99)
HCT VFR BLD AUTO: 26.7 % (ref 37–47)
HGB BLD-MCNC: 9.1 G/DL (ref 12.5–16)
IMM GRANULOCYTES # BLD AUTO: 0.02 K/UL
IMM GRANULOCYTES NFR BLD: 0 %
LYMPHOCYTES NFR BLD: 1.63 K/UL
LYMPHOCYTES RELATIVE PERCENT: 22 % (ref 24–44)
MAGNESIUM SERPL-MCNC: 2 MG/DL (ref 1.8–2.4)
MCH RBC QN AUTO: 30.8 PG (ref 27–31)
MCHC RBC AUTO-ENTMCNC: 34.1 G/DL (ref 32–36)
MCV RBC AUTO: 90.5 FL (ref 78–100)
METANEPH/PLASMA INTERP: ABNORMAL
METANEPH/PLASMA INTERP: NORMAL
METANEPHRINE: 0.25 NMOL/L (ref 0–0.49)
METANEPHRINE: 0.38 NMOL/L (ref 0–0.49)
MONOCYTES NFR BLD: 0.54 K/UL
MONOCYTES NFR BLD: 7 % (ref 0–5)
NEUTROPHILS NFR BLD: 67 % (ref 36–66)
NEUTS SEG NFR BLD: 5.03 K/UL
NORMETANEPHRINE PLASMA: 0.67 NMOL/L (ref 0–0.89)
NORMETANEPHRINE PLASMA: 2.09 NMOL/L (ref 0–0.89)
PHOSPHATE SERPL-MCNC: 3.6 MG/DL (ref 2.5–4.9)
PLATELET # BLD AUTO: 212 K/UL (ref 140–440)
PMV BLD AUTO: 10.3 FL (ref 7.5–11.1)
POTASSIUM SERPL-SCNC: 4.4 MMOL/L (ref 3.5–5.1)
RBC # BLD AUTO: 2.95 M/UL (ref 4.2–5.4)
RENIN COMMENT: NORMAL
RENIN COMMENT: NORMAL
RENIN PLAS-CCNC: 0.2 NG/ML/HR
RENIN PLAS-CCNC: 1 NG/ML/HR
SODIUM SERPL-SCNC: 135 MMOL/L (ref 136–145)
WBC OTHER # BLD: 7.5 K/UL (ref 4–10.5)

## 2025-06-26 PROCEDURE — 6370000000 HC RX 637 (ALT 250 FOR IP): Performed by: NURSE PRACTITIONER

## 2025-06-26 PROCEDURE — 85025 COMPLETE CBC W/AUTO DIFF WBC: CPT

## 2025-06-26 PROCEDURE — 2500000003 HC RX 250 WO HCPCS: Performed by: NURSE PRACTITIONER

## 2025-06-26 PROCEDURE — 6370000000 HC RX 637 (ALT 250 FOR IP): Performed by: STUDENT IN AN ORGANIZED HEALTH CARE EDUCATION/TRAINING PROGRAM

## 2025-06-26 PROCEDURE — APPSS60 APP SPLIT SHARED TIME 46-60 MINUTES: Performed by: NURSE PRACTITIONER

## 2025-06-26 PROCEDURE — 83735 ASSAY OF MAGNESIUM: CPT

## 2025-06-26 PROCEDURE — 94761 N-INVAS EAR/PLS OXIMETRY MLT: CPT

## 2025-06-26 PROCEDURE — 84100 ASSAY OF PHOSPHORUS: CPT

## 2025-06-26 PROCEDURE — 6360000002 HC RX W HCPCS: Performed by: NURSE PRACTITIONER

## 2025-06-26 PROCEDURE — 2580000003 HC RX 258: Performed by: NURSE PRACTITIONER

## 2025-06-26 PROCEDURE — 6370000000 HC RX 637 (ALT 250 FOR IP): Performed by: INTERNAL MEDICINE

## 2025-06-26 PROCEDURE — 82962 GLUCOSE BLOOD TEST: CPT

## 2025-06-26 PROCEDURE — 6360000002 HC RX W HCPCS: Performed by: STUDENT IN AN ORGANIZED HEALTH CARE EDUCATION/TRAINING PROGRAM

## 2025-06-26 PROCEDURE — 97530 THERAPEUTIC ACTIVITIES: CPT

## 2025-06-26 PROCEDURE — 80048 BASIC METABOLIC PNL TOTAL CA: CPT

## 2025-06-26 PROCEDURE — 99223 1ST HOSP IP/OBS HIGH 75: CPT | Performed by: INTERNAL MEDICINE

## 2025-06-26 PROCEDURE — 36410 VNPNXR 3YR/> PHY/QHP DX/THER: CPT

## 2025-06-26 PROCEDURE — 99232 SBSQ HOSP IP/OBS MODERATE 35: CPT | Performed by: NURSE PRACTITIONER

## 2025-06-26 PROCEDURE — 76937 US GUIDE VASCULAR ACCESS: CPT

## 2025-06-26 PROCEDURE — C1751 CATH, INF, PER/CENT/MIDLINE: HCPCS

## 2025-06-26 RX ORDER — DOXAZOSIN 2 MG/1
2 TABLET ORAL EVERY 12 HOURS SCHEDULED
Qty: 60 TABLET | Refills: 0
Start: 2025-06-26 | End: 2025-07-26

## 2025-06-26 RX ORDER — SPIRONOLACTONE 25 MG/1
25 TABLET ORAL DAILY
Qty: 30 TABLET | Refills: 0
Start: 2025-06-27

## 2025-06-26 RX ORDER — SODIUM CHLORIDE 0.9 % (FLUSH) 0.9 %
5-40 SYRINGE (ML) INJECTION EVERY 12 HOURS SCHEDULED
Status: DISCONTINUED | OUTPATIENT
Start: 2025-06-26 | End: 2025-06-26 | Stop reason: HOSPADM

## 2025-06-26 RX ORDER — LIDOCAINE HYDROCHLORIDE 10 MG/ML
50 INJECTION, SOLUTION INFILTRATION; PERINEURAL ONCE
Status: DISCONTINUED | OUTPATIENT
Start: 2025-06-26 | End: 2025-06-26 | Stop reason: HOSPADM

## 2025-06-26 RX ORDER — SODIUM CHLORIDE 9 MG/ML
INJECTION, SOLUTION INTRAVENOUS PRN
Status: DISCONTINUED | OUTPATIENT
Start: 2025-06-26 | End: 2025-06-26 | Stop reason: HOSPADM

## 2025-06-26 RX ORDER — TICAGRELOR 90 MG/1
90 TABLET, FILM COATED ORAL 2 TIMES DAILY
Qty: 60 TABLET | Refills: 0
Start: 2025-06-26

## 2025-06-26 RX ORDER — SODIUM CHLORIDE 0.9 % (FLUSH) 0.9 %
5-40 SYRINGE (ML) INJECTION PRN
Status: DISCONTINUED | OUTPATIENT
Start: 2025-06-26 | End: 2025-06-26 | Stop reason: HOSPADM

## 2025-06-26 RX ADMIN — HEPARIN SODIUM 5000 UNITS: 5000 INJECTION INTRAVENOUS; SUBCUTANEOUS at 14:43

## 2025-06-26 RX ADMIN — ATORVASTATIN CALCIUM 20 MG: 10 TABLET, FILM COATED ORAL at 09:47

## 2025-06-26 RX ADMIN — LOSARTAN POTASSIUM 100 MG: 100 TABLET, FILM COATED ORAL at 09:47

## 2025-06-26 RX ADMIN — BUSPIRONE HYDROCHLORIDE 10 MG: 5 TABLET ORAL at 14:43

## 2025-06-26 RX ADMIN — MEROPENEM 1000 MG: 1 INJECTION, POWDER, FOR SOLUTION INTRAVENOUS at 11:46

## 2025-06-26 RX ADMIN — VERAPAMIL HYDROCHLORIDE 240 MG: 240 TABLET, FILM COATED, EXTENDED RELEASE ORAL at 09:40

## 2025-06-26 RX ADMIN — BUSPIRONE HYDROCHLORIDE 10 MG: 5 TABLET ORAL at 09:47

## 2025-06-26 RX ADMIN — TICAGRELOR 90 MG: 90 TABLET ORAL at 09:47

## 2025-06-26 RX ADMIN — HEPARIN SODIUM 5000 UNITS: 5000 INJECTION INTRAVENOUS; SUBCUTANEOUS at 05:34

## 2025-06-26 RX ADMIN — SODIUM CHLORIDE, PRESERVATIVE FREE 10 ML: 5 INJECTION INTRAVENOUS at 11:02

## 2025-06-26 RX ADMIN — SPIRONOLACTONE 25 MG: 50 TABLET ORAL at 09:40

## 2025-06-26 RX ADMIN — DOXAZOSIN 2 MG: 1 TABLET ORAL at 09:47

## 2025-06-26 RX ADMIN — ASPIRIN 325 MG: 325 TABLET ORAL at 09:47

## 2025-06-26 RX ADMIN — PANTOPRAZOLE SODIUM 40 MG: 40 TABLET, DELAYED RELEASE ORAL at 05:35

## 2025-06-26 RX ADMIN — SODIUM CHLORIDE, PRESERVATIVE FREE 10 ML: 5 INJECTION INTRAVENOUS at 09:40

## 2025-06-26 NOTE — CONSULTS
Midline meets therapeutic needs at this time.  Powerglide Pro inserted in Right cephalic x 2 attempts using sterile ultrasound guided technique.  Brisk blood return, flushes without resistance.

## 2025-06-26 NOTE — CONSULTS
Infectious Disease Consult Note  2025   Patient Name: Natalia Ortiz : 1945   Impression  ESBL Klebsiella pneumoniae UTI:  Nocturnal Enuresis:   80 yo female living with nocturnal enuresis mgt per urology as OP with desmopressin (reports has been incontinent of bowels and bladder for about a year but no other urinary symptoms) and reports has had intermittent UTIs x 1 year, most recently treated 2025 for E.coli with po Bactrim, admitted 2025 presenting with an unwitnessed fall at home, found to have severe hyponatremia with RICO, diagnosed with an acute CVA with residual aphagia and right-sided weakness. Her urine culture grew ESBL E.coli and she was started on IV meropenem.   No reported allergies to ABX. CrCl 36. Afebrile. No leukocytosis.   -UA WBC 13, RBC 2, urine culture: ESBL Klebsiella pneumoniae >100,000 CFU/ml (sensi to ertapenem and meropenem)  -UA WBC 86, RBC 33  -PCXR: no acute pulmonary infiltrate. Small left effusion. No evidence of pneumothorax.   RICO on Mild Possible CKD2 or 3/ Severe Hyponatremia:  Dr. Benson onboard  Fall:  Acute CVA/ Past CVA:  Dr. Solis, neurology, onboard, mgt with DAPT as diagnosed per MRI/MRA with acute lacunar infarct left frontal lobe with aphagia and right-sided weakness  DMII:  PAD:  HTN/ HLD:  ORLANDO:  Class I Obesity: BMI: 30.24 kg/m2  Multi-morbidity: per PMHx: HTN, HLD, DMII, CVA, PVD, nocturnal enuresis, diverticulosis, GERD, hiatal hernia, ORLANDO on CPAP and obesity  Plan:  Continue IV meropenem 500 mg q12h, plan for a 9 day cumulative course (end date 2025), may transition to IV Invanz 1 gm q24h (CrCl 36, no adjustment in dosing needed) at DC to complete therapy  Ordered a midline for IV ABX access  Follow up with PCP after DC  DW the patient and her daughter, at bedside, the plan of care  OK from ID standpoint to DC when medically ready after midline placement  Will sign off and not follow the patient actively, please reconsult as

## 2025-06-26 NOTE — PROGRESS NOTES
Inpatient Critical Care Progress Note 6/23/2025        Natalia LANGE [facility]  1945  [id number]      Assessment/Plan:    Hyponatremia, seemingly euvolemic, likely secondary to hydrochlorothiazide, improving  History of hypertension  History of hyperlipidemia  History of diabetes mellitus type 2  History of peripheral vascular disease (prior stroke, no significant residual neurologic sequelae, mild expressive impairment per chart)    Nephrology has been consulted and essentially is managing electrolyte replacement at this time, since the serum sodium consistently exceeds 120 mEq/dL, the patient may transfer out of ICU setting  Avoidance of hydrochlorothiazide moving forward, adjustment of other antihypertensive therapy as appropriate  Medical management of other comorbidities as provided    Short course of antibiotic provided by night physician to complete therapy for a presumptive uncomplicated urinary infection pending results of culture data.    No family members present at bedside      Complex medical decisions required for today's evaluation management, reviewed during critical care rounds    AARON Chacon  [phone]        Subjective:    No new acute overnight events reported.  Acceptable hemodynamics, rhythm.    Patient admits to fatigue denied any other specific complaints this morning however she is confused oriented to person, not place \"I'm at home\".    Spontaneous diuresis        Review of Systems   Constitutional:  Negative for fatigue.   HENT: Negative.     Eyes: Negative.    Respiratory: Negative.     Cardiovascular: Negative.    Gastrointestinal: Negative.    Endocrine: Negative.    Genitourinary: Negative.    Musculoskeletal: Negative.    Skin: Negative.    Allergic/Immunologic: Negative.    Neurological:  Positive for weakness.   Hematological: Negative.    Psychiatric/Behavioral: Negative.              Physical Exam:          BP (!) 157/67   Pulse 66   Temp 97.9 °F (36.6 °C) (Oral)   Resp 16   
     Inpatient Critical Care Progress Note 6/24/2025        Natalia LANGE Angel  1945  [id number]      Assessment/Plan:    Hyponatremia, seemingly euvolemic, likely secondary to hydrochlorothiazide, improving  RICO (new onset noted since admission ? contrast related)  History of hypertension  History of hyperlipidemia  History of diabetes mellitus type 2  History of peripheral vascular disease (prior stroke, no significant residual neurologic sequelae, mild expressive impairment per chart)  \"New\" worsening expressive aphasia, facial asymmetry, right sidied weakness 6/23/2025, \"stroke alert\", S/P TNK    Nephrology has been consulted and essentially is managing electrolyte replacement at this time  Follow up CT head post TNK  Neurolgy assisting with mangement  Avoidance of hydrochlorothiazide moving forward, adjustment of other antihypertensive therapy as appropriate  Medical management of other comorbidities as provided    Short course of antibiotic provided by night physician to complete therapy for a presumptive uncomplicated urinary infection.      Complex medical decisions required for today's evaluation management, reviewed during critical care rounds    E [facility]  [phone]        Subjective:      Yesterday, latter morning/early afternoon \"stroke alert\", S/P TNK  No new acute overnight events reported.  Acceptable hemodynamics, rhythm.      Speech seemingly nearly back to baseline        Review of Systems   Constitutional:  Negative for fatigue.   HENT: Negative.     Eyes: Negative.    Respiratory: Negative.     Cardiovascular: Negative.    Gastrointestinal: Negative.    Endocrine: Negative.    Genitourinary: Negative.    Musculoskeletal: Negative.    Skin: Negative.    Allergic/Immunologic: Negative.    Neurological:  Positive for weakness.   Hematological: Negative.    Psychiatric/Behavioral: Negative.              Physical Exam:          BP (!) 134/55   Pulse 89   Temp 97.9 °F (36.6 °C) (Oral)   Resp 18  
     Inpatient Critical Care Progress Note 6/25/2025        Natalia LANGE Angel  1945  [id number]      Assessment/Plan:    Hyponatremia, seemingly euvolemic, likely secondary to hydrochlorothiazide, improving  RICO (mild) noted following admission, improving (?  Contrast related)  History of hypertension  History of hyperlipidemia  History of diabetes mellitus type 2  History of peripheral vascular disease (prior stroke, no significant residual neurologic sequelae, mild expressive impairment per chart)  \"New\" worsening expressive aphasia, facial asymmetry, right sidied weakness 6/23/2025, \"stroke alert\", S/P TNK    Nephrology has been consulted and essentially is managing electrolyte replacement at this time  Follow up CT head post TNK notable for a small (multiple) left basal ganglia lacunar infarct, vascular study reveals significant stenosis of right vertebral arteries, 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  Neurolgy assisting with mangement, currently receiving aspirin and Brilinta plus statin for secondary prevention  Avoidance of hydrochlorothiazide moving forward, adjustment of other antihypertensive therapy as appropriate  Medical management of other comorbidities as provided    Short course of antibiotic provided by night physician to complete therapy for a presumptive uncomplicated urinary infection, meropenem added yesterday for identification of ESBL Klebsiella from urine, anticipate 5-day course of therapy      Complex medical decisions required for today's evaluation management, reviewed during critical care rounds    Given the patient's overall improving clinical status she may transfer out of ICU setting, transition care to hospital medicine service.  Follow-up PT JULY Chacon  [phone]        Subjective:    No new acute overnight events reported.  Noted elevation of blood pressure over the evening hours, Cardene infusion initially added to current therapies 
  CLINICAL PHARMACY SERVICES  Renal dose adjustment made per [facility] protocol     Estimated Creatinine Clearance: 20 mL/min (A) (based on SCr of 2.4 mg/dL (H)).  Recent Labs     06/23/25  0950 06/23/25  1255 06/24/25  0422   BUN 12 15 22*   CREATININE 1.5* 1.9* 2.4*   HGB  --   --  9.6*   HCT  --   --  28.1*   PLT  --   --  218     ENOXAPARIN PREVIOUS ORDER:  Enoxaparin 40 mg subq once daily      NEW RENALLY ADJUSTED ORDER:    Enoxaparin 30 mg subq once daily       Mohamud Eli [facility]  6/24/2025 4:00 PM   
4 Eyes Skin Assessment     NAME:  Natalia Ortiz  YOB: 1945  MEDICAL RECORD NUMBER:  [id number]    The patient is being assessed for  Admission    I agree that at least one RN has performed a thorough Head to Toe Skin Assessment on the patient. ALL assessment sites listed below have been assessed.      Areas assessed by both nurses:    Head, Face, Ears, Shoulders, Back, Chest, Arms, Elbows, Hands, Sacrum. Buttock, Coccyx, Ischium, Legs. Feet and Heels, and Under Medical Devices         Does the Patient have a Wound? No noted wound(s)       Hair Prevention initiated by RN: Yes  Wound Care Orders initiated by RN: Yes    Pressure Injury (Stage 3,4, Unstageable, DTI, NWPT, and Complex wounds) if present, place Wound referral order by RN under : No    New Ostomies, if present place, Ostomy referral order under : No     Nurse 1 eSignature: Electronically signed by Tisha Vieyra RN on 6/22/25 at 4:04 AM EDT    **SHARE this note so that the co-signing nurse can place an eSignature**    Nurse 2 eSignature: Electronically signed by Yesica Zambrano RN on 6/22/25 at 5:07 AM EDT   
4 Eyes Skin Assessment     NAME:  Natalia Ortiz  YOB: 1945  MEDICAL RECORD NUMBER:  [id number]    The patient is being assessed for  Transfer to New Unit    I agree that at least one RN has performed a thorough Head to Toe Skin Assessment on the patient. ALL assessment sites listed below have been assessed.      Areas assessed by both nurses:    Head, Face, Ears, Shoulders, Back, Chest, Arms, Elbows, Hands, Sacrum. Buttock, Coccyx, Ischium, and Legs. Feet and Heels        Does the Patient have a Wound? No noted wound(s)       Hair Prevention initiated by RN: No  Wound Care Orders initiated by RN: No    Pressure Injury (Stage 3,4, Unstageable, DTI, NWPT, and Complex wounds) if present, place Wound referral order by RN under : No    New Ostomies, if present place, Ostomy referral order under : No     Nurse 1 eSignature: Electronically signed by Hanh Ann RN on 6/25/25 at 6:59 PM EDT    **SHARE this note so that the co-signing nurse can place an eSignature**    Nurse 2 eSignature: Electronically signed by Reinaldo Schafer RN on 6/25/25 at 9:36 PM EDT    
[facility]  DEPARTMENT OF SPEECH/LANGUAGE PATHOLOGY  DAILY PROGRESS NOTE  Natalia Ortiz  [birth year]25  [id number]  Syncope and collapse [R55]  Hypomagnesemia [E83.42]  Bradycardia [R00.1]  Hyponatremia [E87.1]  RICO (acute kidney injury) [N17.9]  No Known Allergies      Pt was seen this date for dysphagia treatment.       IMPRESSION AND RECOMMENDATIONS: Natalia Ortiz was seen today for repeat dysphagia evaluation following stroke alert on 25 s/p TNK administration. Pt seen for initial evaluation yesterday 25, which recommended a soft and bite sized diet/thin liquids. SAM Chisholm reports pt with word-finding errors and perseveration. Natalia Ortiz was positioned upright in bed with her granddaughter(s) and daughter present for majority of visit. Pt agreeable and cooperative. Denies new onset difficulty swallowing. On room air. Mild right lower facial droop noted. Lingual movements are WFL. Vocal quality is weak. Pt benefits from cues to increase volume. Informal speech-screen was performed during this visit. Pt oriented to self, type of place IND. Requires verbal cues for year and  (month only). Speech intelligibility is WFL. Paraphasias and verbal/gestural perseveration noted. Benefits from verbal/visual cues and repetition. Pt would benefits from ongoing speech-language evaluation during this admission/at next level of care to determine areas of strength/weakness. Education provided to pt/family at bedside.     Natalia Ortiz consumed trials of ice chips, thin liquid via cup/straw, puree, and regular solid during this visit. Pt presents with evidence of oropharyngeal dysphagia characterized by prolonged bolus preparation/mastication, slow a/p transit with mild oral holding, reduced oral clearance, and questionable timeliness of the pharyngeal swallow. Pt fed self for majority of trials. Mild oral holding noted with regular solid as well as oral residue. Pt clears with liquid wash. No 
Daughter and grand daughter updated at bedside this morning.    E Cordasco  [phone]  
MRI department needs screening form filled out for this patient.   
Nephrology Progress Note  6/23/2025 7:47 AM        Subjective:   Admit Date: 6/21/2025  PCP: Pedro Morris MD    Interval History: No major event, sodium gradually increased with several pharmacological manipulation and rise of sodium is very acceptable    Diet: Reasonable    ROS: No overt shortness of breath, urine output 2.2 L over the last 24 hours.  Variable blood pressure recorded, no fever    Data:     Current meds:    verapamil  240 mg Oral Daily    losartan  100 mg Oral Daily    hydrALAZINE  25 mg Oral 3 times per day    sodium chloride flush  5-40 mL IntraVENous 2 times per day    enoxaparin  40 mg SubCUTAneous Daily    cefTRIAXone (ROCEPHIN) IV  1,000 mg IntraVENous Q24H    aspirin  325 mg Oral Daily    atorvastatin  20 mg Oral Daily    busPIRone  10 mg Oral TID    pantoprazole  40 mg Oral QAM AC    clopidogrel  75 mg Oral Daily      sodium chloride      [Held by provider] niCARdipine Stopped (06/21/25 2307)         I/O last 3 completed shifts:  In: 772.1 [P.O.:740; I.V.:32.1]  Out: 4550 [Urine:4550]    CBC:   Recent Labs     06/21/25  1624 06/22/25  0608 06/23/25  0405   WBC 8.3 6.5 6.6   HGB 11.3* 10.3* 11.5*    219 242          Recent Labs     06/22/25  1830 06/23/25  0007 06/23/25  0405   * 124* 125*   K 4.2 4.1 4.1   CL 88* 91* 92*   CO2 20* 21 19*   BUN 13 13 12   CREATININE 1.2 1.2 1.1   GLUCOSE 176* 116* 124*       Lab Results   Component Value Date    CALCIUM 9.0 06/23/2025    PHOS 3.5 06/23/2025       Objective:     Vitals: BP (!) 157/67   Pulse 66   Temp 97.9 °F (36.6 °C) (Oral)   Resp 16   Ht 1.651 m (5' 5\")   Wt 83.7 kg (184 lb 8.4 oz)   SpO2 98%   BMI 30.71 kg/m² ,    General appearance: She was alert awake without any acute distress  HEENT: No gross conjunctival pallor  Neck: Supple  Lungs: No crackles  Heart: Regular rate and rhythm  Abdomen: Soft, nontender  Extremities: No gross edema      Problem List :         Impression :     Severe hyponatremia-more than 
Nephrology Progress Note  6/24/2025 8:03 AM        Subjective:   Admit Date: 6/21/2025  PCP: Pedro Morris MD    Interval History: Eventful last 18 hours  Apparently had neurological symptoms-acute cerebrovascular accident were suspected, she underwent computed tomography without intravenous contrast material and had thrombolytic therapy.  Looks like she had several hypotensive episode, 1 reading of blood pressure was 71/63 early morning.    When I saw her she is awake able but not completely oriented family member in the room        Diet: Unclear probably not much    ROS: No acute distress or shortness of breath, I was not able to screen for neurological symptoms.  Hypotensive episode, but no fever, urine output recorded only 800 cc for the last 24 hours    Data:     Current meds:    spironolactone  25 mg Oral Daily    doxazosin  2 mg Oral 2 times per day    sodium chloride flush  5-40 mL IntraVENous 2 times per day    enoxaparin  40 mg SubCUTAneous Daily    verapamil  240 mg Oral Daily    losartan  100 mg Oral Daily    sodium chloride flush  5-40 mL IntraVENous 2 times per day    aspirin  325 mg Oral Daily    atorvastatin  20 mg Oral Daily    busPIRone  10 mg Oral TID    pantoprazole  40 mg Oral QAM AC    clopidogrel  75 mg Oral Daily      sodium chloride      sodium chloride      [Held by provider] niCARdipine Stopped (06/21/25 2307)         I/O last 3 completed shifts:  In: 280 [P.O.:280]  Out: 2300 [Urine:2300]    CBC:   Recent Labs     06/22/25  0608 06/23/25  0405 06/24/25  0422   WBC 6.5 6.6 7.8   HGB 10.3* 11.5* 9.6*    242 218          Recent Labs     06/23/25  0950 06/23/25  1255 06/24/25  0422   * 127* 129*   K 4.5 4.4 4.2   CL 96* 93* 96*   CO2 23 21 21   BUN 12 15 22*   CREATININE 1.5* 1.9* 2.4*   GLUCOSE 168* 312* 150*       Lab Results   Component Value Date    CALCIUM 9.3 06/24/2025    PHOS 4.4 06/24/2025       Objective:     Vitals: BP (!) 134/55   Pulse 89   Temp 97.9 °F (36.6 
Nephrology Progress Note  6/25/2025 9:58 AM        Subjective:   Admit Date: 6/21/2025  PCP: Pedro Morris MD    Interval History: I have seen Ms. Ortiz early morning, she is alert awake but not oriented    Diet: Unclear    ROS: She was not oriented to place person and time no other overt distress variable blood pressure recorded with very labile blood pressure-if those data are accurate, urine output recorded about 930 cc for the last 24 hours, no fever    Data:     Current meds:    heparin (porcine)  5,000 Units SubCUTAneous 3 times per day    meropenem  500 mg IntraVENous Q12H    atorvastatin  20 mg Oral Daily    ticagrelor  90 mg Oral BID    spironolactone  25 mg Oral Daily    doxazosin  2 mg Oral 2 times per day    sodium chloride flush  5-40 mL IntraVENous 2 times per day    verapamil  240 mg Oral Daily    losartan  100 mg Oral Daily    sodium chloride flush  5-40 mL IntraVENous 2 times per day    aspirin  325 mg Oral Daily    busPIRone  10 mg Oral TID    pantoprazole  40 mg Oral QAM AC      sodium chloride      sodium chloride           I/O last 3 completed shifts:  In: 560 [P.O.:560]  Out: 1730 [Urine:1730]    CBC:   Recent Labs     06/23/25  0405 06/24/25  0422 06/25/25  0541   WBC 6.6 7.8 7.5   HGB 11.5* 9.6* 9.0*    218 204          Recent Labs     06/23/25  1255 06/24/25  0422 06/25/25  0541   * 129* 133*   K 4.4 4.2 4.1   CL 93* 96* 100   CO2 21 21 22   BUN 15 22* 23*   CREATININE 1.9* 2.4* 2.0*   GLUCOSE 312* 150* 131*       Lab Results   Component Value Date    CALCIUM 9.2 06/25/2025    PHOS 4.0 06/25/2025       Objective:     Vitals: BP (!) 167/60   Pulse 86   Temp 98.5 °F (36.9 °C) (Oral)   Resp 21   Ht 1.651 m (5' 5\")   Wt 82.7 kg (182 lb 5.1 oz)   SpO2 97%   BMI 30.34 kg/m² ,    General appearance: She was alert, awake but not oriented  HEENT: At least 1+ conjunctival pallor no gross scleral icterus  Neck: Supple  Lungs: No gross crackles  Heart: Seemed regular rate and 
Nephrology Progress Note  6/26/2025 2:32 PM        Subjective:   Admit Date: 6/21/2025  PCP: Pedro Morris MD    Interval History:  I have seen Ms. Ortiz  early morning, her granddaughter was in the room   she was alert awake and getting better oriented    Diet:  started eating some    ROS:   no overt confusion, urine output recorded 90 cc for the last 24 hours, variable blood pressure number recorded    Data:     Current meds:    meropenem  1,000 mg IntraVENous Q12H    sodium chloride flush  5-40 mL IntraVENous 2 times per day    lidocaine 1 % injection  50 mg IntraDERmal Once    heparin (porcine)  5,000 Units SubCUTAneous 3 times per day    atorvastatin  20 mg Oral Daily    ticagrelor  90 mg Oral BID    spironolactone  25 mg Oral Daily    doxazosin  2 mg Oral 2 times per day    sodium chloride flush  5-40 mL IntraVENous 2 times per day    verapamil  240 mg Oral Daily    losartan  100 mg Oral Daily    sodium chloride flush  5-40 mL IntraVENous 2 times per day    aspirin  325 mg Oral Daily    busPIRone  10 mg Oral TID    pantoprazole  40 mg Oral QAM AC      sodium chloride      sodium chloride      sodium chloride           I/O last 3 completed shifts:  In: 300 [P.O.:300]  Out: 1800 [Urine:1800]    CBC:   Recent Labs     06/24/25  0422 06/25/25  0541 06/26/25  0542   WBC 7.8 7.5 7.5   HGB 9.6* 9.0* 9.1*    204 212          Recent Labs     06/25/25  0541 06/25/25  1540 06/26/25  0542   * 130* 135*   K 4.1 4.2 4.4    97* 101   CO2 22 21 21   BUN 23* 23* 20   CREATININE 2.0* 1.6* 1.3*   GLUCOSE 131* 130* 142*       Lab Results   Component Value Date    CALCIUM 8.8 06/26/2025    PHOS 3.6 06/26/2025       Objective:     Vitals: BP (!) 158/84 Comment: manual bp taken  Pulse 96   Temp 97 °F (36.1 °C) (Oral)   Resp 21   Ht 1.651 m (5' 5\")   Wt 82.7 kg (182 lb 5.1 oz)   SpO2 98%   BMI 30.34 kg/m² ,    General appearance:   alert and awake.  Oriented  HEENT:   no gross conjunctival pallor  Neck: 
Neurology Service Progress Note  [facility]   Patient Name: Natalia Ortiz  : 1945        Subjective:   Reason for consult: Stroke symptoms s/p TNK  Chart reviewed in detail. Patient seen and examined, sitting up in bed. No family at bedside today. Per patient and nurse, her speech is improved and she is able to joke with the nurses. Patient feels she is doing well, denies any recurrence or worsening of symptoms. Discussed with patient MRI results which did demonstrate small acute infarct in the left posterior frontal parafalcine cortex. Carotid US not able to be completed due to central line. MRA head and neck order, MRA neck pending. MRA head discussed with patient, noted to have 0.2cm aneurysm in the distal right PCA that was not noted on prior CTAs. Discussed with patient she will likely need outpatient follow up with neurointervention unless MRA neck demonstrates findings requiring neurointervention inpatient.     Past Medical History:   Diagnosis Date    Basal cell carcinoma of skin 2013    right foot-Dr Esteban    Diabetes mellitus (HCC)     Diverticulosis     GERD (gastroesophageal reflux disease)     Hiatal hernia     History of nuclear stress test 2018    Lexiscan- EF 63%, No infarct or ischemia, normal study.    Hyperlipidemia     Hypertension     Pap smear for cervical cancer screening 2009    Neg    Pap smear for cervical cancer screening 2010    Neg    Pap smear for cervical cancer screening 2011    Neg    Respiratory disorder     small airway disorder    Sleep apnea     CPAP at 9.0 cm    Stroke, acute, thrombotic (HCC) 2011    Venous (peripheral) insufficiency 2013    right greater saphenous vein, Left small saphenous vein-Dr Esteban    :   Past Surgical History:   Procedure Laterality Date    ANKLE FRACTURE SURGERY Right     ARM SURGERY Left     BUNIONECTOMY Left     CHOLECYSTECTOMY      COLECTOMY      left colon resection for diverticulitis 
Occupational Therapy    Occupational Therapy Treatment Note    Name: Natalia Ortiz MRN: [id number] :   1945   Date:  2025   Admission Date: 2025 Room:  -A     Primary Problem:  The primary encounter diagnosis was Hyponatremia. Diagnoses of Syncope and collapse, Bradycardia, RICO (acute kidney injury), and Hypomagnesemia were also pertinent to this visit.     Restrictions/Precautions: General Precautions, Fall Risk, Telemetry, BP cuff, Contact Precautions, Bed alarm     Communication with other providers: SAM Chisholm    Subjective:  Patient states: \"I feel much better!\"  Pain: Pt denied pain this date    Objective:    Observation: Supine in bed upon OT arrival. Agreeable to treatment. Pt with intermittent pauses in responses and daughter reports pt is about to have an MRA.  Objective Measures: Pt reported feeling dizzy sitting EOB BP 85/63, later during session pt /55    Treatment, including education:  Therapeutic Activity Training:   Therapeutic activity training was instructed today.  Cues were given for safety, sequence, UE/LE placement, awareness, and balance.    Self Care Training:   Cues were given for safety, sequence, UE/LE placement, visual cues, and balance.      Pt received in supine upon OT arrival. Pleasant and agreeable to treatment.  Pt educated on the role of OT, POC, importance of EOB/OOB activity, d/c planning and recommendations  Pt completed supine to sitting EOB SBA and min cues for hand placement using bed rails  Pt completed sit to stand from bed CGA with RW and min cues for hand placement using RW  Pt reported feeling dizzy and requested to sit and completed stand to sit to bed CGA   Pt educated on leg pumps for blood circulation   Pt completed sit to stand from bed CGA with RW  Pt completed stand to sit to bed CGA, after standing for ~2 minutes pt requested to sit back down due to feeling dizzy  Pt completed oral hygiene seated EOB SBA with setup to manage 
Occupational Therapy  [facility] ACUTE CARE OCCUPATIONAL THERAPY EVALUATION    Natalia Ortiz, 1945, 2106/2106-A, 6/23/2025    Discharge Recommendation: Encourage minimal to moderate OT services at discharge, typically 1-2 hours/day, 5 days/week.     History:  [place]:  The primary encounter diagnosis was Hyponatremia. Diagnoses of Syncope and collapse, Bradycardia, RICO (acute kidney injury), and Hypomagnesemia were also pertinent to this visit.    Subjective:  Patient states: \"I can't see anything without my glasses!'  Pain: Pt denied pain this date  Communication with other providers: PT [place], SAM Chisholm  Restrictions: General Precautions, Fall Risk, Telemetry, Pulse ox, BP cuff, IV, Bed/chair alarm    Home Setup/Prior level of function:  Social/Functional History  Lives With: Alone  Type of Home: House  Home Layout: Two level, Able to Live on Main level with bedroom/bathroom  Home Access: Stairs to enter with rails  Entrance Stairs - Number of Steps: 2  Bathroom Shower/Tub: Spongebathes  Bathroom Toilet: Standard  Bathroom Equipment: Grab bars around toilet  Home Equipment: Cane, Walker - Rolling   Has the patient had two or more falls in the past year or any fall with injury in the past year?: Yes (pt states more than 3)  Receives Help From: Family, Home health (nurse), Friend(s)  Prior Level of Assist for ADLs: Independent  Prior Level of Assist for Homemaking: Needs assistance (home health assists with cleaning, cooking, laundry)  Homemaking Responsibilities: No  Prior Level of Assist for Ambulation: Independent community ambulator (mod I w/ cane)  Prior Level of Assist for Transfers: Independent  Active : No    Examination:  Observation: Supine in bed upon OT arrival. Pleasant and agreeable to evaluation.  Vision: WFL (glasses)  Hearing: slightly [place]  Vitals: pt reported feeling lightheaded sitting EOB, BP 98/56    Body Systems and functions:  ROM: WFL in all joints BL UEs  Strength: 4/5 
Occupational Therapy  [facility] ACUTE CARE OCCUPATIONAL THERAPY RE-EVALUATION    Natalia Ortiz, 1945, 2106/2106-A, 6/24/2025    Discharge Recommendation: Encourage minimal to moderate OT services at discharge, typically 1-2 hours/day, 5 days/week.     History:  [place]:  The primary encounter diagnosis was Hyponatremia. Diagnoses of Syncope and collapse, Bradycardia, RICO (acute kidney injury), Hypomagnesemia, and Stroke, acute, thrombotic (HCC) were also pertinent to this visit.    Subjective:  Patient states: \"I just don't want to!\"  Pain: Pt denied pain this date  Communication with other providers: PT Cary, RN Kathrine  Restrictions: General Precautions, Fall Risk, Telemetry, Pulse ox, BP cuff, IV, Bed/chair alarm     Home Setup/Prior level of function:  Social/Functional History  Lives With: Alone  Type of Home: House  Home Layout: Two level, Able to Live on Main level with bedroom/bathroom  Home Access: Stairs to enter with rails  Entrance Stairs - Number of Steps: 2  Bathroom Shower/Tub: Spongebathes  Bathroom Toilet: Standard  Bathroom Equipment: Grab bars around toilet  Home Equipment: Cane, Walker - Rolling (mod I w/ cane)  Has the patient had two or more falls in the past year or any fall with injury in the past year?: Yes (pt states more than 3)   Receives Help From: Family, Home health (nurse), Friend(s)  Prior Level of Assist for ADLs: Independent  Prior Level of Assist for Homemaking: Needs assistance  Homemaking Responsibilities: No  Prior Level of Assist for Ambulation: Independent community ambulator (mod I w/ cane)  Prior Level of Assist for Transfers: Independent  Active : No    Examination:  Observation: Supine in bed upon OT arrival. Agreeable to evaluation.  Vision: WFL (glasses)  Hearing: slightly [place]  Vitals: Stable vitals throughout session on room air    Body Systems and functions:  ROM: 0-110` BL shoulder flexion, WFL all distal BL joints   Strength: 3/5 MMT BL 
Physical Therapy  Name: Natalia Ortiz MRN: [id number] :   1945   Date:  2025   Admission Date: 2025 Room:  [address] [place] [address]   Restrictions/Precautions:         General precautions, falls   Communication with other providers:   RN states pt is ok to see for therapy  Subjective:  Patient states: I need a minute to get my bearings.   Pain:   Location, Type, Intensity (0/10 to 10/10):  denies pain  Objective:    Observation:  pt in low fowlers in bed    Treatment, including education/measures:    Agreeable to therapy. Review of POC and daily notes. Nurse reports pt sat up for a while yesterday and was so fatigued that she had difficulty standing. Reported she was appropriate for therapy but to take it easy. Pt performed supine> EOB with SBA and time to complete. She was able to sit EOB for a total of aprox 17m. She performed 4 STS with a maximum standing tolerance of aprox 2m with UE support on AD and CGA. Long rest breaks required between bouts of standing. She returned to supine with SBA, but required maxA to scoot up and position in bed. She was left with all needs met. Delayed response time noted for duration of session.     Supine Exercises:  Ankle pumps x 15  Heel slides x 10  Hip abd x 10  Hip IR/ER x 10      Therapeutic Exercise:  Therapeutic exercises were instructed today.  Cues were given for technique, safety, recruitment, and rationale.  Cues were verbal and/or tactile.    Transfers with line management of CVC, pure wick  Rolling: SBA  Supine to sit :SBA  Sit to supine :SBA  Scooting :MaxA (supine)  Sit to stand :CGA  Stand to sit :CGA        Safety  Patient left safely in the bed, with call light/phone in reach with alarm applied. Gait belt was used for transfers and gait.    Assessment / Impression:     Patient's tolerance of treatment:  good   Adverse Reaction: no  Significant change in status and impact:  no  Barriers to improvement:  activity tolerance limitations    Plan for Next Session:  
Report called and given to nurse Nithya. All questions and concerns addressed at this time. Pt has all belongings packed and along bedside. Pt denies any pain at this time. Antibiotics completed ML is flushed and capped. Transportation set up for 7475.  
Spiritual Health History and Assessment/Progress Note  [facility]    Crisis, Code Stroke,  ,      Name: Natalia Ortiz MRN: [id number]    Age: 79 y.o.     Sex: female   Language: English   Adventist: None   Hyponatremia     Date: 6/23/2025                           Spiritual Assessment began in [facility] ICU        Referral/Consult From: Rounding   Encounter Overview/Reason: Crisis  Service Provided For: Patient    Tamara, Belief, Meaning:   Patient Other: Code stroke and unable to assess at this time  Family/Friends No family/friends present      Importance and Influence:  Patient unable to assess at this time  Family/Friends No family/friends present    Community:  Patient feels well-supported. Support system includes: Children  Family/Friends No family/friends present    Assessment and Plan of Care:     Patient Interventions include: Other: Words fo comfort and hope for the Pt on her way to imaging.   Family/Friends Interventions include: No family/friends present    Patient Plan of Care: Spiritual Care available upon further referral  Family/Friends Plan of Care: No family/friends present    Electronically signed by Chaplain Gerardo on 6/23/2025 at 12:03 PM   
Spiritual Health History and Assessment/Progress Note  [facility]    Spiritual/Emotional Needs, Code Stroke,  ,      Name: Natalia Ortiz MRN: [id number]    Age: 79 y.o.     Sex: female   Language: English   Episcopalian: None   Hyponatremia     Date: 6/24/2025            Total Time Calculated: 7 min              Spiritual Assessment continued in [facility] ICU        Referral/Consult From: Rounding   Encounter Overview/Reason: Spiritual/Emotional Needs  Service Provided For: Patient, Patient and family together    Tamara, Belief, Meaning:   Patient unable to assess at this time  Family/Friends Other: Did not discuss      Importance and Influence:  Patient unable to assess at this time  Family/Friends Other: Did not discuss    Community:  Patient feels well-supported. Support system includes: Children and Extended family  Family/Friends feel well-supported. Support system includes: Parent/s and Extended family    Assessment and Plan of Care:     Patient Interventions include: Facilitated expression of thoughts and feelings  Family/Friends Interventions include: Facilitated expression of thoughts and feelings    Patient Plan of Care: Spiritual Care available upon further referral  Family/Friends Plan of Care: Spiritual Care available upon further referral    Electronically signed by Chaplain Gerardo on 6/24/2025 at 12:30 PM   
Spiritual Health History and Assessment/Progress Note  [facility]    Spiritual/Emotional Needs, Code Stroke,  ,      Name: Natalia Ortiz MRN: [id number]    Age: 79 y.o.     Sex: female   Language: English   Mu-ism: None   Hyponatremia     Date: 6/23/2025            Total Time Calculated: 8 min              Spiritual Assessment continued in [facility] ICU        Referral/Consult From: Rounding   Encounter Overview/Reason: Spiritual/Emotional Needs  Service Provided For: Patient and family together    Tamara, Belief, Meaning:   Patient unable to assess at this time  Family/Friends Other: Unable to assess at this time.       Importance and Influence:  Patient unable to assess at this time  Family/Friends     Community:  Patient feels well-supported. Support system includes: Children and Extended family  Family/Friends feel well-supported. Support system includes: Parent/s and Children    Assessment and Plan of Care:     Patient Interventions include: Facilitated expression of thoughts and feelings. Pt seems to be non-verbal at this time but does respond with smiles, eyes, and head movement when  spoke to her. Seems to have good family support. Chaplains will continue to offer Pt and family support.   Family/Friends Interventions include: Facilitated expression of thoughts and feelings    Patient Plan of Care: Spiritual Care available upon further referral  Family/Friends Plan of Care: Spiritual Care available upon further referral    Electronically signed by Chaplain Gerardo on 6/23/2025 at 4:16 PM   
Transport at bedside pt being transported to facility.  
[facility]  DEPARTMENT OF SPEECH/LANGUAGE PATHOLOGY  DAILY PROGRESS NOTE  Natalia Ortiz  6/25/2025  [id number]  Syncope and collapse [R55]  Hypomagnesemia [E83.42]  Bradycardia [R00.1]  Hyponatremia [E87.1]  RICO (acute kidney injury) [N17.9]  No Known Allergies      Pt was seen this date for dysphagia treatment.       IMPRESSION AND RECOMMENDATIONS: Natalia Ortiz was seen today for diet tolerance monitoring and speech language evaluation.  She was alert, pleasant, and cooperative and was able to communicate at conversational level with SLP and RN regarding basic needs.  Pt followed all commands for repeat oral mechanism exam which demonstrated no focal weakness/asymmetry.  Observed pt for breakfast meal which was Soft and bite-size/Thins.  Mildly reduced oral clearance for soft solids which cleared with liquid bolus.  Normal mastication for all trials.  Pt demonstrated 0 s/s aspiration across all textures 100% trials.  Pt indicated that she did not wish to target trials for diet advancement to regular solids at this time.        Speech language evaluation completed with mildly reduced auditory comprehension for complex y/n ?'s and reading comprehension at sentence level (normal decoding).  Pt was able to self-correct with semantic and phonetic cues.  Mild expressive language deficit with 80% accuracy for responsive naming tasks.  Semantic and phonetic cues also beneficial.  Pt able to accurately describe [place] picture at sentence level with no errors.  Required minimal prompts for initiation.  Noted minimal, occasional imprecise targets for articulation.      Recommend Natalia Ortiz consume a soft and bite sized diet and thin liquids with medications PO at tolerated provided use of aspiration precautions. Pt requires set-up assistance. SLP to follow to monitor diet tolerance and target speech/language goals.     GOALS (current status in bold):  Short-term Goals  Timeframe for Short-term 
Disposition: Placement   Estimated Date of Discharge: Hopefully 24 hours   Patient requires continued admission due pending imaging and placement    Surrogate Decision Maker/ ARUNA Stanley, daughter, to help with decision making      Personally reviewed Lab Studies and Imaging     Discussed with ICU team - Ok to transfer out of ICU today.     Monitor meropenem with Cr and CBC  Monitor losartan with Cr and K   Monitor aldactone with Cr and K     Subjective:     Chief Complaint:     Patient seen and examined at bedside in the AM today. Patient states she feels ok   Was on nicardipine briefly overnight. Ok for permissive HTN       Review of Systems:      Pertinent positives and negatives discussed in HPI    Objective:     Intake/Output Summary (Last 24 hours) at 6/25/2025 1239  Last data filed at 6/25/2025 0642  Gross per 24 hour   Intake 380 ml   Output 930 ml   Net -550 ml      Vitals:   Vitals:    06/25/25 0900 06/25/25 1000 06/25/25 1100 06/25/25 1200   BP: (!) 167/60 (!) 178/44 (!) 150/52 (!) 150/73   Pulse: 86 93 89 90   Resp: 21 15 17 25   Temp:    99.1 °F (37.3 °C)   TempSrc:    Oral   SpO2:       Weight:       Height:             Physical Exam:      General: NAD, AO X 3  Eyes: EOMI  ENT: neck supple  Cardiovascular: Sinus rhythm. No heaves or thrills.    Respiratory: Clear to auscultation. No wheeze, rales or rhonchi. No tachypnea.   Gastrointestinal: Soft, non tender  Genitourinary: no suprapubic tenderness  Musculoskeletal: No edema  Skin: warm, dry  Neuro: Awake,alert and oriented. Baseline aphasia - baseline per patient. Cranial nerves II to XII grossly intact, strength upper UE /LE 4+/5  Psych: Mood appropriate.         Medications:   Medications:    heparin (porcine)  5,000 Units SubCUTAneous 3 times per day    meropenem  500 mg IntraVENous Q12H    atorvastatin  20 mg Oral Daily    ticagrelor  90 mg Oral BID    spironolactone  25 mg Oral Daily    doxazosin  2 mg Oral 2 times per day    sodium chloride flush 
about 1135.  At bedside patient was aphasic with slowed and delayed speech and some mild right droop.  Patient was subsequently taken to radiology for stat CT head and CTA head and neck.  CT head was completed which showed old lacunar infarcts but no acute changes.  CTA head and neck could not be completed due to vessels blowing up and therefore contrast could not be administered.  [facility] stroke team evaluated patient and stroke neurologist Dr. Krista Hilraio after going through contraindications recommended TNK.  Discussed with family risks versus benefits and after going through contraindications and checklist, family requested for some more time to deliberate. Family eventually decided to proceed with TNK which was administered at about 1455 hrs.  Intensivist was notified and patient will be monitored in the ICU.      Review of Systems:      Pertinent positives and negatives discussed in HPI    Objective:     Intake/Output Summary (Last 24 hours) at 6/23/2025 1513  Last data filed at 6/23/2025 0401  Gross per 24 hour   Intake 872.13 ml   Output 1900 ml   Net -1027.87 ml      Vitals:   Vitals:    06/23/25 0521 06/23/25 0600 06/23/25 0800 06/23/25 1452   BP: (!) 211/73 (!) 157/67 (!) 138/122 135/62   Pulse:  66 84    Resp:  16 25    Temp:   97.6 °F (36.4 °C)    TempSrc:       SpO2:  98% 100%    Weight:       Height:             Physical Exam:      General: NAD, confused  Eyes: EOMI  ENT: neck supple  Cardiovascular: Regular rate.  Respiratory: Clear to auscultation  Gastrointestinal: Soft, non tender  Genitourinary: no suprapubic tenderness  Musculoskeletal: No edema  Skin: warm, dry  Neuro: Awake, confusion, aphasia with slowed and delayed speech, cranial nerves II to XII grossly intact, strength upper UE /LE 4-5/5  Psych: Mood appropriate.         Medications:   Medications:    spironolactone  25 mg Oral Daily    doxazosin  2 mg Oral 2 times per day    sodium chloride flush  5-40 mL IntraVENous 2 times per day    [START 
all needs within reach and chair alarm on     Assessment / Impression:    Patient's tolerance of treatment: Poor  Adverse Reaction: BP sitting /84  Significant change in status and impact: Improved from initial evaluation  Barriers to improvement: Self-limiting and BP high 158/84    Plan for Next Session:    Continue per OT POC    Time in: 1143  Time out: 1208  Timed treatment minutes: 25  Total treatment time: 25    Electronically signed by:    Cathy Briones, S/OT     Bandar Suazo, OTR/L, MOT, [id number]   \"I, the qualified professional, was present and in the room for the entire session. The student participates in the delivery of services when the qualified practitioner is directing the service, making the skilled judgment, and is responsible for the assessment and treatment.\"  
    Social Drivers of Health     Financial Resource Strain: Not on file   Food Insecurity: No Food Insecurity (6/24/2025)    Hunger Vital Sign     Worried About Running Out of Food in the Last Year: Never true     Ran Out of Food in the Last Year: Never true   Transportation Needs: No Transportation Needs (6/24/2025)    PRAPARE - Transportation     Lack of Transportation (Medical): No     Lack of Transportation (Non-Medical): No   Physical Activity: Not on file   Stress: Not on file   Social Connections: Not on file   Intimate Partner Violence: Not on file   Housing Stability: Low Risk  (6/24/2025)    Housing Stability Vital Sign     Unable to Pay for Housing in the Last Year: No     Number of Times Moved in the Last Year: 0     Homeless in the Last Year: No      Family History   Problem Relation Age of Onset    Diabetes Mother 78    Coronary Art Dis Mother     Asthma Father     Diabetes Father 73         ROS (10 systems)  In addition to that documented in the HPI above, the additional ROS was obtained:  Constitutional: Denies fevers or chills  Eyes: Denies vision changes  ENMT: Denies sore throat  CV: Denies chest pain  Resp: Denies SOB  GI: Denies vomiting or diarrhea  : Denies painful urination  MSK: Denies recent trauma  Skin: Denies new rashes  Neuro: Denies new numbness or tingling or weakness  Endocrine: Denies unexpected weight loss  Heme: Denies bleeding disorders    Physical Exam:       Wt Readings from Last 3 Encounters:   06/24/25 82.7 kg (182 lb 5.1 oz)   04/27/20 98 kg (216 lb)   09/09/19 90.7 kg (200 lb)     Temp Readings from Last 3 Encounters:   06/26/25 97 °F (36.1 °C) (Oral)   09/09/19 97.7 °F (36.5 °C) (Oral)   08/09/19 98.1 °F (36.7 °C) (Oral)     BP Readings from Last 3 Encounters:   06/26/25 (!) 158/84   09/09/19 (!) 187/81   08/09/19 (!) 171/58     Pulse Readings from Last 3 Encounters:   06/26/25 96   09/09/19 69   08/09/19 69        Gen: A&O x self, family at bedside, location, NAD, 
assist    Treatment/Goals  Short-term Goals  Timeframe for Short-term Goals: LOS or until goals are met  Goal 1: Pt will tolerate a soft and bite sized diet/thin liquids without overt s/sx of penetration/aspiration in 100% of trials  Goal 2: Pt will participate in trials with regular solids to assess potential for diet texture upgrade  Goal 3: Pt/caregivers will demonstrate understanding of SLP recommendations/POC    General  Chart Reviewed: Yes  Behavior/Cognition: Alert;Cooperative  Temperature Spikes Noted: No  Respiratory Status: Room air  O2 Device: None (Room air)  Communication Observation: Functional  Follows Directions: Simple  Dentition: Dentures top;Dentures bottom  Patient Positioning: Upright in bed  Baseline Vocal Quality: Normal  Prior Dysphagia History: Pt seen for dysphagia evaluation at [facility] on 12/19/2011; recommended a regular diet/thin liquids  Consistencies Administered: Regular;Pureed;Thin - straw;Thin - cup    Vision/Hearing   Hearing concerns    Oral Motor Deficits  Labial: Decreased rate  Dentition: Upper & lower dentures  Oral Hygiene: Clean  Lingual: Decreased rate;Decreased strength  Velum: No Impairment  Mandible: No impairment  Consistencies Administered: Regular;Pureed;Thin - straw;Thin - cup    Oral Phase Dysfunction  Oral Phase  Oral Phase: Exceptions     Indicators of Pharyngeal Phase Dysfunction   Indicators of Pharyngeal Phase Dysfunction  Pharyngeal Phase Comment: WFL    Prognosis  Prognosis: Good  Prognosis Considerations: Severity of Impairments;Medical Diagnosis;Medical Prognosis;Previous Level of Function;Co-Morbidities  Consulted and agree with results and recommendations: Patient;RN  RN Name: Kathrine Martin  Patient Education: Recommendations/POC  Patient Education Response: Verbalizes understanding;Needs reinforcement  Safety Devices in place: Yes  Type of devices: Bed alarm in place;Left in bed       Therapy Time  SLP Individual Minutes  Time In: 1122  Time Out: 
bilaterally redemonstrated with old lacunar infarcts in the subinsular area on the left once again noted. IMPRESSION: CHRONIC FINDINGS ARE ONCE AGAIN DESCRIBED. NO ACUTE BLEED OR SHIFT. NO APPARENT CHANGE FROM PRIOR. Workstation ID: [id number]  Dictated and Electronically Signed By: Tim Candelario MD [facility] 6/23/2025 12:32        CT CERVICAL SPINE WO CONTRAST  Result Date: 6/21/2025  PROCEDURE: CT CERVICAL SPINE WO CONTRAST DATE OF EXAM:  6/21/2025 17:38 DEMOGRAPHICS: 79 years old Female INDICATION: fall; possible head injury COMPARISON: No existing relevant imaging study corresponding to the same anatomical region is available. TECHNIQUE: Contiguous axial slices of the cervical spine were submitted without IV administration of contrast. Additional coronal and sagittal reformatted images were submitted.    DOSE OPTIMIZATION: CT radiation dose optimization techniques (automated exposure  control, and use of iterative reconstruction techniques, or adjustment of the mA and/or kV according to patient size) were used to limit patient radiation dose. FINDINGS: CT CERVICAL SPINE: No evidence for acute fracture or malalignment. Straightening of the normal cervical lordosis. Advanced multilevel hypertrophic degenerative disc and facet disease. The dens is intact. Paraspinal soft tissues are within normal limits. Calcified carotid artery plaque noted bilaterally. IMPRESSION: 1.  No acute finding. This dictation was created with voice recognition software.  While attempts have  been made to review the dictation as it is transcribed, on occasion the spoken word can be misinterpreted by the technology leading to omissions or inappropriate words, phrases or sentences.  Dictated and Electronically Signed By: Osmani Boyle MD [facility] 6/21/2025 18:21        CT HEAD WO CONTRAST  Result Date: 6/21/2025  PROCEDURE: CT HEAD WO CONTRAST DATE OF EXAM:  6/21/2025 17:38 DEMOGRAPHICS: 79 
Value Ref Range    POC Glucose 101 (H) 74 - 99 mg/dL   POCT Glucose    Collection Time: 06/22/25  6:07 AM   Result Value Ref Range    POC Glucose 84 74 - 99 mg/dL   Basic Metabolic Panel    Collection Time: 06/22/25  6:08 AM   Result Value Ref Range    Sodium 120 (L) 136 - 145 mmol/L    Potassium 3.8 3.5 - 5.1 mmol/L    Chloride 86 (L) 99 - 110 mmol/L    CO2 22 21 - 32 mmol/L    Anion Gap 12 9 - 17 mmol/L    Glucose 73 (L) 74 - 99 mg/dL    BUN 13 7 - 20 mg/dL    Creatinine 1.0 0.6 - 1.2 mg/dL    Est, Glom Filt Rate 56 (L) >60 mL/min/1.73m2    Calcium 8.3 8.3 - 10.6 mg/dL   CBC with Diff    Collection Time: 06/22/25  6:08 AM   Result Value Ref Range    WBC 6.5 4.0 - 10.5 k/uL    RBC 3.39 (L) 4.20 - 5.40 m/uL    Hemoglobin 10.3 (L) 12.5 - 16.0 g/dL    Hematocrit 28.7 (L) 37.0 - 47.0 %    MCV 84.7 78.0 - 100.0 fL    MCH 30.4 27.0 - 31.0 pg    MCHC 35.9 32.0 - 36.0 g/dL    RDW 13.6 11.7 - 14.9 %    Platelets 219 140 - 440 k/uL    MPV 11.1 7.5 - 11.1 fL    Neutrophils % 67 (H) 36 - 66 %    Lymphocytes % 23 (L) 24 - 44 %    Monocytes % 9 (H) 0 - 5 %    Eosinophils % 1 0 - 3 %    Basophils % 0 0 - 1 %    Immature Granulocytes % 0 0 %    Neutrophils Absolute 4.36 k/uL    Lymphocytes Absolute 1.48 k/uL    Monocytes Absolute 0.59 k/uL    Eosinophils Absolute 0.06 k/uL    Basophils Absolute 0.02 k/uL    Immature Granulocytes Absolute 0.02 k/uL   Magnesium    Collection Time: 06/22/25  6:08 AM   Result Value Ref Range    Magnesium 2.0 1.8 - 2.4 mg/dL   Phosphorus    Collection Time: 06/22/25  6:08 AM   Result Value Ref Range    Phosphorus 3.1 2.5 - 4.9 mg/dL   POCT Glucose    Collection Time: 06/22/25  8:29 AM   Result Value Ref Range    POC Glucose 111 (H) 74 - 99 mg/dL       Electronically signed by Vel Chacon Jr, DO on 6/22/2025 at 10:13 AM

## 2025-06-26 NOTE — CARE COORDINATION
CM met with Pt daughter Lizeth, discharge plan remains SNF.  [facility]    CM call to [facility], they have no beds available at this time.     CM call to [facility] with referral, they can accept Pt today if medically ready.    PS to Dr. Gonzalez to update.     12:12 PM  Pt on discharge.  CM set stretcher transportation with [facility] for 1545 due to Pt receiving IV antibiotics at this time.      Pt family updated  Pt [facility] updated  [facility] updated.

## 2025-06-26 NOTE — DISCHARGE SUMMARY
Please use this note as a progress note for the day if the patient does not discharge today      Discharge Summary    Name:  Natalia Ortiz /Age/Sex: 1945  (79 y.o. female)   MRN & CSN:  [id number] & [id number] Admission Date/Time: 2025  4:13 PM   Attending:  Julius Gonzalez MD Discharging Physician: Julius Gonzalez MD       Admission Diagnosis:   Hypertension  Hyperlipidemia  Type II DM  CVA  PVD  Nocturnal enuresis  UTI    Discharge Diagnosis, hospital course, assessment and plan:  Natalia Ortiz is a 79 y.o.  female  who presents with Hyponatremia    Patient admitted on 2025  4:13 PM    Per H+P:    Natalia Ortiz is a 79 y.o. female with pmh HTN, HLD, Type II DM, CVA, PVD, hx of nocturnal enuresis managed with desmopressin who presented to the ED today after a fall. History is provided by patient and her daughter-in-law. Patient lives at home alone and had an unwitnessed fall in the bathroom. Patient was found by her daughter-in-law who was on her way to pick her up for an outing when she found her like this. She reports the patient was out cold and still had on her clothes on and likely hit her head against the door based on her positioning. Of note, patient was started on bactrim for a UTI on  and has been taking this diligently. She has had N/V/D as a result of this and has been generally feeling unwell over the past few days.     In the ED, the patient's labs were notable for Na 113 and mild RICO. CT head and c-spine were negative for any acute injuries. Nephrology consulted in the ED and the patient was started on IVFs. Patient endorses FULL CODE status. Medication list reviewed with daughter-in-law. Patient currently denies headaches, chest pain, shortness of breath, nausea, vomiting, abdominal pain.      Patient to be admitted to the ICU for closer monitoring.     # Acute CVA  - Last known well about 11:35 AM on 2025 s/p TNK  - NIH stroke scale 4 on initial eval   - CT head old

## 2025-06-26 NOTE — DISCHARGE INSTR - COC
Continuity of Care Form    Patient Name: Natalia Ortiz   :  1945  MRN:  [id number]    Admit date:  2025  Discharge date:      Code Status Order: Full Code   Advance Directives:     Admitting Physician:  Julius Gonzalez MD  PCP: Pedro Morris MD    Discharging Nurse: [facility] Unit/Room#: 3022/3022-A  Discharging Unit Phone Number: [phone]    Emergency Contact:   Extended Emergency Contact Information  Primary Emergency Contact: Lizeth Segovia  Mobile Phone: [phone]  Relation: Child  Preferred language: English   needed? No  Secondary Emergency Contact: FlaviaRaiza  Address: [place]  Home Phone: [phone]  Mobile Phone: [phone]  Relation: Other    Past Surgical History:  Past Surgical History:   Procedure Laterality Date    ANKLE FRACTURE SURGERY Right     ARM SURGERY Left     BUNIONECTOMY Left     CHOLECYSTECTOMY      COLECTOMY      left colon resection for diverticulitis    COLONOSCOPY      COLONOSCOPY  2017    d coli, polyp, grade 1 int hem, biopsy, call office next week for results    INGUINAL HERNIA REPAIR Right     OTHER SURGICAL HISTORY Right 8/28/15    ORIF ankle    SKIN CANCER EXCISION  2013    [facility] Right foot-Dr Esteban    UMBILICAL HERNIA REPAIR         Immunization History:   Immunization History   Administered Date(s) Administered    COVID-19, MODERNA Bivalent, (age 12y+), IM, 50 mcg/0.5 mL 10/21/2022    COVID-19, PFIZER PURPLE top, DILUTE for use, (age 12 y+), 30mcg/0.3mL 2021, 2021, 10/18/2021    COVID-19, PFIZER, , (age 12y+), IM, 30mcg/0.3mL 10/06/2023, 2024    Influenza 10/08/2010    Influenza, FLUZONE High Dose, (age 65 y+), IM, Trivalent PF, 0.5mL 10/11/2011, 10/02/2012, 10/11/2013    Pneumococcal, PPSV23, PNEUMOVAX 23, (age 2y+), SC/IM, 0.5mL 10/25/2005, 10/11/2013    TDaP, ADACEL (age 10y-64y), BOOSTRIX (age 10y+), IM, 0.5mL 10/16/2012    Td, unspecified formulation 10/18/1991

## 2025-06-27 ENCOUNTER — TELEPHONE (OUTPATIENT)
Age: 80
End: 2025-06-27

## 2025-06-27 NOTE — TELEPHONE ENCOUNTER
Received referral for patient. Left message for patient to return call to schedule hospital follow up in stroke clinic.

## 2025-07-10 NOTE — PROGRESS NOTES
Outpatient Vascular Neurology Service Consult Note     Patient Name: Natalia Ortiz  : 1945        Subjective:   Reason for consult:   Natalia Ortiz is a 79-year-old female with a past medical history of HTN, H LD, DM, [place] on CPAP, basal cell carcinoma skin, stroke  presenting for hospital follow-up stroke clinic.  The patient was admitted to [facility] on 2025 after being found down at home.  She was initially treated for hyponatremia, .  The patient had a sudden onset of right-sided weakness and aphasia.  Stroke alert was called.  She was evaluated by [facility] teleneurology.  NIH 6.  The patient did receive TNK.  Unable to have CTA due to IV access and CKD.  MRI of the brain demonstrated small acute infarct in left posterior frontal parafalcine cortex.  MRA head noted 0.2 cm aneurysm in the distal right PCA.  MRA neck nonvisualization of the V3 and V4 right vertebral artery either occluded or severely hypoplastic tortuosity of the proximal bilateral ICAs with approximately 50% range.    The patient was on aspirin, Plavix and statin.  Verify now P2Y12 was subtherapeutic and the patient was transitioned from Plavix to Brilinta.    A1c 6.2, LDL 55.     The patient is accompanied by her son and daughter-in-law during the visit.  The family states she is still at [facility] for therapies.  Working with speech therapy for some word finding difficulty.  The patient follows closely with outpatient cardiology Familia Reyes who monitors her carotid stenosis.  She was evaluated by Dr. Song carotid artery stenosis and no surgery was recommended.      Past Medical History:   Diagnosis Date    Basal cell carcinoma of skin 2013    right foot-Dr Esteban    Complicated UTI (urinary tract infection) 2025    Diabetes mellitus (HCC)     Diverticulosis     GERD (gastroesophageal reflux disease)     Hiatal hernia     History of nuclear stress test 2018    Lexiscan- EF 63%, No infarct or

## 2025-07-16 ENCOUNTER — OFFICE VISIT (OUTPATIENT)
Age: 80
End: 2025-07-16
Payer: MEDICARE

## 2025-07-16 VITALS — SYSTOLIC BLOOD PRESSURE: 112 MMHG | OXYGEN SATURATION: 94 % | DIASTOLIC BLOOD PRESSURE: 64 MMHG | HEART RATE: 69 BPM

## 2025-07-16 DIAGNOSIS — I67.1 POSTERIOR COMMUNICATING ARTERY ANEURYSM: ICD-10-CM

## 2025-07-16 DIAGNOSIS — I69.30 SEQUELA OF CEREBROVASCULAR ACCIDENT: Primary | ICD-10-CM

## 2025-07-16 PROCEDURE — 1159F MED LIST DOCD IN RCRD: CPT | Performed by: NURSE PRACTITIONER

## 2025-07-16 PROCEDURE — 3078F DIAST BP <80 MM HG: CPT | Performed by: NURSE PRACTITIONER

## 2025-07-16 PROCEDURE — 1160F RVW MEDS BY RX/DR IN RCRD: CPT | Performed by: NURSE PRACTITIONER

## 2025-07-16 PROCEDURE — 1123F ACP DISCUSS/DSCN MKR DOCD: CPT | Performed by: NURSE PRACTITIONER

## 2025-07-16 PROCEDURE — G8399 PT W/DXA RESULTS DOCUMENT: HCPCS | Performed by: NURSE PRACTITIONER

## 2025-07-16 PROCEDURE — 99204 OFFICE O/P NEW MOD 45 MIN: CPT | Performed by: NURSE PRACTITIONER

## 2025-07-16 PROCEDURE — G8427 DOCREV CUR MEDS BY ELIG CLIN: HCPCS | Performed by: NURSE PRACTITIONER

## 2025-07-16 PROCEDURE — 1090F PRES/ABSN URINE INCON ASSESS: CPT | Performed by: NURSE PRACTITIONER

## 2025-07-16 PROCEDURE — 3074F SYST BP LT 130 MM HG: CPT | Performed by: NURSE PRACTITIONER

## 2025-07-16 PROCEDURE — G8417 CALC BMI ABV UP PARAM F/U: HCPCS | Performed by: NURSE PRACTITIONER

## 2025-07-16 PROCEDURE — 1036F TOBACCO NON-USER: CPT | Performed by: NURSE PRACTITIONER

## 2025-07-16 PROCEDURE — 1111F DSCHRG MED/CURRENT MED MERGE: CPT | Performed by: NURSE PRACTITIONER

## 2025-07-16 RX ORDER — GABAPENTIN 300 MG/1
300 CAPSULE ORAL 3 TIMES DAILY
COMMUNITY

## 2025-07-16 RX ORDER — ONDANSETRON 4 MG/1
4 TABLET, FILM COATED ORAL EVERY 8 HOURS PRN
COMMUNITY

## 2025-07-16 RX ORDER — DULAGLUTIDE 0.75 MG/.5ML
INJECTION, SOLUTION SUBCUTANEOUS
COMMUNITY